# Patient Record
Sex: FEMALE | Race: WHITE | Employment: PART TIME | URBAN - METROPOLITAN AREA
[De-identification: names, ages, dates, MRNs, and addresses within clinical notes are randomized per-mention and may not be internally consistent; named-entity substitution may affect disease eponyms.]

---

## 2017-04-04 PROCEDURE — 88175 CYTOPATH C/V AUTO FLUID REDO: CPT | Performed by: OBSTETRICS & GYNECOLOGY

## 2017-04-04 PROCEDURE — 87624 HPV HI-RISK TYP POOLED RSLT: CPT | Performed by: OBSTETRICS & GYNECOLOGY

## 2017-04-11 ENCOUNTER — HOSPITAL ENCOUNTER (OUTPATIENT)
Dept: PHYSICAL THERAPY | Facility: HOSPITAL | Age: 37
Setting detail: THERAPIES SERIES
Discharge: HOME | End: 2017-04-11
Attending: PHYSICIAN ASSISTANT
Payer: COMMERCIAL

## 2017-04-11 DIAGNOSIS — M25.552 BILATERAL HIP PAIN: ICD-10-CM

## 2017-04-11 DIAGNOSIS — M54.50 ACUTE BILATERAL LOW BACK PAIN WITHOUT SCIATICA: ICD-10-CM

## 2017-04-11 DIAGNOSIS — M25.551 BILATERAL HIP PAIN: ICD-10-CM

## 2017-04-11 DIAGNOSIS — R20.2 LEFT LEG PARESTHESIAS: ICD-10-CM

## 2017-04-11 DIAGNOSIS — M76.31 ILIOTIBIAL BAND SYNDROME OF RIGHT SIDE: Primary | ICD-10-CM

## 2017-04-11 PROCEDURE — 97161 PT EVAL LOW COMPLEX 20 MIN: CPT

## 2017-04-11 PROCEDURE — 97110 THERAPEUTIC EXERCISES: CPT

## 2017-04-11 PROCEDURE — 97530 THERAPEUTIC ACTIVITIES: CPT

## 2017-04-11 NOTE — PROGRESS NOTES
SPINE EVALUATION:   Referring Physician: Dr. Salvador Wise  Diagnosis: KELLY hip pain, LBP, LLE paraesthesias     Date of Service: 4/11/2017     PATIENT SUMMARY   Silvano Seals is a 43year old female who presents to therapy today with complaints of R-reji about this, but is concerned that she won't be able to help him if he gets injured); sitting for several hours as on long drives and walking over a mile is also irritating.    Reena Hines describes prior level of function as with low level intermittent back pa Observation/Posture: Patient sits and stands at rest with rounded shoulders and forward head, flexible to VC. Rounded lumbar spine at rest.   Gait: Unremarkable.    Neuro Screen: NT this date, sensation intact per patient report, not experiencing paraesth test: min inc mm tension KELLY  Hip scour: negative KELLY  ROBERTO CARLOS: negative KELLY    Balance: NT this date, will assess in future as needed. Today’s Treatment and Response: Good overall response to treatment.  Patient appears motivated to improved and likely t participate in planning and for this course of care. Thank you for your referral. Please co-sign or sign and return this letter via fax as soon as possible to [phone].  If you have any questions, please contact me at Dept: [phone]    Sincerely,

## 2017-04-13 ENCOUNTER — HOSPITAL ENCOUNTER (OUTPATIENT)
Dept: PHYSICAL THERAPY | Facility: HOSPITAL | Age: 37
Setting detail: THERAPIES SERIES
Discharge: HOME | End: 2017-04-13
Attending: PHYSICIAN ASSISTANT
Payer: COMMERCIAL

## 2017-04-13 PROCEDURE — 97140 MANUAL THERAPY 1/> REGIONS: CPT

## 2017-04-13 PROCEDURE — 97110 THERAPEUTIC EXERCISES: CPT

## 2017-04-13 NOTE — PROGRESS NOTES
Dx: KELYL hip pain, LBP, LLE paraesthesias           Authorized # of Visits:  10        Next MD visit: none scheduled  Fall Risk: standard         Precautions: n/a             Subjective: Patient reports that she has been feeling improved the past few days s demonstrate ability to squat to floor to retrieve greater than 20# without pain to assist her son on the floor and lift groceries/laundry. 2. Patient will improve LE neural mobility by progressing appropriately to long sit slump tensioner.    3.  Patient w

## 2017-04-19 ENCOUNTER — HOSPITAL ENCOUNTER (OUTPATIENT)
Dept: PHYSICAL THERAPY | Facility: HOSPITAL | Age: 37
Setting detail: THERAPIES SERIES
Discharge: HOME | End: 2017-04-19
Attending: PHYSICIAN ASSISTANT
Payer: COMMERCIAL

## 2017-04-19 PROCEDURE — 97140 MANUAL THERAPY 1/> REGIONS: CPT

## 2017-04-19 PROCEDURE — 97110 THERAPEUTIC EXERCISES: CPT

## 2017-04-19 NOTE — PROGRESS NOTES
Dx: KELLY hip pain, LBP, LLE paraesthesias           Authorized # of Visits:  10        Next MD visit: none scheduled  Fall Risk: standard         Precautions: n/a             Subjective: Patient reports that she was feeling overall very good, but her allerg appropriately to long sit slump tensioner. - progress  3. Patient will improve lumbar extension AROM to 30 deg without pain. - progress   4.  Patient will demonstrate ability to jog on TM for 5 min without increase in pain to progress to return to independe

## 2017-04-21 ENCOUNTER — HOSPITAL ENCOUNTER (OUTPATIENT)
Dept: PHYSICAL THERAPY | Facility: HOSPITAL | Age: 37
Setting detail: THERAPIES SERIES
Discharge: HOME | End: 2017-04-21
Attending: PHYSICIAN ASSISTANT
Payer: COMMERCIAL

## 2017-04-21 PROCEDURE — 97110 THERAPEUTIC EXERCISES: CPT

## 2017-04-21 PROCEDURE — 97140 MANUAL THERAPY 1/> REGIONS: CPT

## 2017-04-21 NOTE — PROGRESS NOTES
Dx: KELLY hip pain, LBP, LLE paraesthesias           Authorized # of Visits:  10        Next MD visit: none scheduled  Fall Risk: standard         Precautions: n/a             Subjective: Patient reports that she is still being effected by her allergies, the Goals: (To be completed in 10 visits)  1. Patient will demonstrate ability to squat to floor to retrieve greater than 20# without pain to assist her son on the floor and lift groceries/laundry. - progress  2.  Patient will improve LE neural mobility b SB trunk rot GTB x 20 R/L       - GTB  - side step x 3 laps  - fwd/retro x 3 laps GTB  - side step with squat x 3 laps       - Bosu alt lunges x 12 R/L Bosu  - with 4.4# ball fwd reach x 10 R/L  - slow march x 20 R/L       Skilled Services: Progressed agapito

## 2017-04-26 ENCOUNTER — APPOINTMENT (OUTPATIENT)
Dept: PHYSICAL THERAPY | Facility: HOSPITAL | Age: 37
End: 2017-04-26
Attending: PHYSICIAN ASSISTANT
Payer: COMMERCIAL

## 2017-04-28 ENCOUNTER — HOSPITAL ENCOUNTER (OUTPATIENT)
Dept: PHYSICAL THERAPY | Facility: HOSPITAL | Age: 37
Setting detail: THERAPIES SERIES
Discharge: HOME | End: 2017-04-28
Attending: PHYSICIAN ASSISTANT
Payer: COMMERCIAL

## 2017-04-28 PROCEDURE — 97140 MANUAL THERAPY 1/> REGIONS: CPT

## 2017-04-28 PROCEDURE — 97110 THERAPEUTIC EXERCISES: CPT

## 2017-04-28 NOTE — PROGRESS NOTES
Dx: KELLY hip pain, LBP, LLE paraesthesias           Authorized # of Visits:  10        Next MD visit: none scheduled  Fall Risk: standard         Precautions: n/a             Subjective: Patient reports things have been a \"little off\" lately with sleeping without pain. - progress   4. Patient will demonstrate ability to jog on TM for 5 min without increase in pain to progress to return to independent gym program.    5.  Patient will be independent and compliant in HEP to maintain progress made in PT. - issue x 20 R/L Seated on SB trunk rot GTB x 20 R/L -      - GTB  - side step x 3 laps  - fwd/retro x 3 laps GTB  - side step with squat x 3 laps GTB  - side step with squat x 3 laps      - Bosu alt lunges x 12 R/L Bosu  - with 4.4# ball fwd reach x 10 R/L  - slo

## 2017-05-03 ENCOUNTER — HOSPITAL ENCOUNTER (OUTPATIENT)
Dept: PHYSICAL THERAPY | Facility: HOSPITAL | Age: 37
Setting detail: THERAPIES SERIES
Discharge: HOME | End: 2017-05-03
Attending: PHYSICIAN ASSISTANT
Payer: COMMERCIAL

## 2017-05-03 PROCEDURE — 97140 MANUAL THERAPY 1/> REGIONS: CPT

## 2017-05-03 PROCEDURE — 97110 THERAPEUTIC EXERCISES: CPT

## 2017-05-03 NOTE — PROGRESS NOTES
Dx: KELLY hip pain, LBP, LLE paraesthesias           Authorized # of Visits:  10        Next MD visit: none scheduled  Fall Risk: standard         Precautions: n/a             Subjective: Patient reports that she was really feeling pretty good for the last f neural mobility by progressing appropriately to long sit slump tensioner. - MET  3. Patient will improve lumbar extension AROM to 30 deg without pain. - progress, 26 deg   4.  Patient will demonstrate ability to jog on TM for 5 min without increase in pain without roller x 8 min - R hip STM x 3 min  - R hip HawkGrips x 5 min - R hip STM x 3 min  - SI METs     - SL N/E R x 2 min  - HS cont relax x 3 sets R/L - SL hip flexor glides x 15  - SL sara glide x 15 - SL hip flexor glides x 15  - SL sara glide x 15 -

## 2017-05-05 ENCOUNTER — HOSPITAL ENCOUNTER (OUTPATIENT)
Dept: PHYSICAL THERAPY | Facility: HOSPITAL | Age: 37
Setting detail: THERAPIES SERIES
Discharge: HOME | End: 2017-05-05
Attending: PHYSICIAN ASSISTANT
Payer: COMMERCIAL

## 2017-05-05 PROCEDURE — 97140 MANUAL THERAPY 1/> REGIONS: CPT

## 2017-05-05 PROCEDURE — 97110 THERAPEUTIC EXERCISES: CPT

## 2017-05-05 PROCEDURE — 97112 NEUROMUSCULAR REEDUCATION: CPT

## 2017-05-05 NOTE — PROGRESS NOTES
Dx: KELLY hip pain, LBP, LLE paraesthesias           Authorized # of Visits:  10        Next MD visit: none scheduled  Fall Risk: standard         Precautions: n/a             Subjective: Patient reports that while she is still very sore from her independent improve LE neural mobility by progressing appropriately to long sit slump tensioner. - MET  3. Patient will improve lumbar extension AROM to 30 deg without pain. - progress, 26 deg   4.  Patient will demonstrate ability to jog on TM for 5 min without increa DLS, 87# x 20  - SLS, 50# x 20 R/L Shuttle  - DLS, 19# x 20  - SLS, 50# x 20 R/L - -    - lumbar STM x 5 min  - lumbar central/uni mobs, gr III x 10 min - SL R hip STM with and without roller x 8 min - SL R hip STM with and without roller x 8 min - R hip S

## 2017-05-12 ENCOUNTER — HOSPITAL ENCOUNTER (OUTPATIENT)
Dept: PHYSICAL THERAPY | Facility: HOSPITAL | Age: 37
Setting detail: THERAPIES SERIES
Discharge: HOME | End: 2017-05-12
Attending: PHYSICIAN ASSISTANT
Payer: COMMERCIAL

## 2017-05-12 PROCEDURE — 97140 MANUAL THERAPY 1/> REGIONS: CPT

## 2017-05-12 PROCEDURE — 97110 THERAPEUTIC EXERCISES: CPT

## 2017-05-12 NOTE — PROGRESS NOTES
Dx: KELLY hip pain, LBP, LLE paraesthesias           Authorized # of Visits:  10        Next MD visit: none scheduled  Fall Risk: standard         Precautions: n/a             Subjective: Patient reports that her back and lateral thigh are feeling very good, ability to squat to floor to retrieve greater than 20# without pain to assist her son on the floor and lift groceries/laundry. - progress  2. Patient will improve LE neural mobility by progressing appropriately to long sit slump tensioner. - MET  3.  Kristen shirt at wall 2 x 10  - IT stretch 3 x 30s   - lumbar shift at wall x 10 R/L  - fwd flex foot up on step x 10 R/L - -       - fwd flex foot up on step x 10 R/L - lumbar shift at wall, 2 x 10 R/L  - fwd flex to floor on chair x 10 R/L x 10 - Prone hip ext w flexor involvement. Updated HEP as listed below. HEP: prone press ups, seated slump, standing lumbar shifts at wall to R/L, clams with RTB; 1/2 kneel hip flexor stretch with iso hold, IT stretch, prone alt hip ext with glute set.      Charges: Manual x 1,

## 2017-05-17 ENCOUNTER — HOSPITAL ENCOUNTER (OUTPATIENT)
Dept: PHYSICAL THERAPY | Facility: HOSPITAL | Age: 37
Setting detail: THERAPIES SERIES
Discharge: HOME | End: 2017-05-17
Attending: PHYSICIAN ASSISTANT
Payer: COMMERCIAL

## 2017-05-17 PROCEDURE — 97530 THERAPEUTIC ACTIVITIES: CPT

## 2017-05-17 PROCEDURE — 97140 MANUAL THERAPY 1/> REGIONS: CPT

## 2017-05-17 PROCEDURE — 97110 THERAPEUTIC EXERCISES: CPT

## 2017-05-17 NOTE — PROGRESS NOTES
Progress/Discharge Summary  Dx: KELLY hip pain, LBP, LLE paraesthesias           Authorized # of Visits:  10        Next MD visit: none scheduled  Fall Risk: standard         Precautions: n/a    Pt has attended 9, cancelled 0, and no shown 0 visits in Physi demonstrate ability to squat to floor to retrieve greater than 20# without pain to assist her son on the floor and lift groceries/laundry. - MET  2. Patient will improve LE neural mobility by progressing appropriately to long sit slump tensioner. - MET  3. Prone  - PPUs on hands x 10      - with light man ovpr x 10 - DKTC 5 x 30s  - seated flex/rot stretch 2 x 30s R/L - 1/2 kneel hip flexor stretch with iso hold x 10  - standing hip flexor stretch 3 x 30s - 1/2 kneel hip flexor stretch with iso hold x 10  - mobs/sacral rocking x 5 min - SL hip flexor stretch 3 x 30s  - lumbar PA mobs/sacral rocking x 5 min - lumbar, hip STM x 5 min  - sacral rocking/distract x 3 min   - Seated on SB trunk rot GTB x 20 R/L Seated on SB trunk rot GTB x 20 R/L - Seated on SB  -

## 2017-10-18 ENCOUNTER — APPOINTMENT (OUTPATIENT)
Dept: GENERAL RADIOLOGY | Age: 37
End: 2017-10-18
Attending: PHYSICIAN ASSISTANT
Payer: COMMERCIAL

## 2017-10-18 ENCOUNTER — HOSPITAL ENCOUNTER (EMERGENCY)
Age: 37
Discharge: HOME | End: 2017-10-18
Attending: EMERGENCY MEDICINE
Payer: COMMERCIAL

## 2017-10-18 VITALS
SYSTOLIC BLOOD PRESSURE: 134 MMHG | HEART RATE: 62 BPM | HEIGHT: 70 IN | RESPIRATION RATE: 16 BRPM | TEMPERATURE: 98 F | OXYGEN SATURATION: 100 % | BODY MASS INDEX: 28.35 KG/M2 | DIASTOLIC BLOOD PRESSURE: 73 MMHG | WEIGHT: 198 LBS

## 2017-10-18 DIAGNOSIS — S93.402A MODERATE LEFT ANKLE SPRAIN, INITIAL ENCOUNTER: Primary | ICD-10-CM

## 2017-10-18 PROCEDURE — 99284 EMERGENCY DEPT VISIT MOD MDM: CPT

## 2017-10-18 PROCEDURE — 73610 X-RAY EXAM OF ANKLE: CPT | Performed by: PHYSICIAN ASSISTANT

## 2017-10-18 PROCEDURE — 73590 X-RAY EXAM OF LOWER LEG: CPT | Performed by: PHYSICIAN ASSISTANT

## 2017-10-18 PROCEDURE — 73610 X-RAY EXAM OF ANKLE: CPT | Performed by: EMERGENCY MEDICINE

## 2017-10-18 RX ORDER — NAPROXEN 500 MG/1
500 TABLET ORAL 2 TIMES DAILY PRN
Qty: 30 TABLET | Refills: 0 | Status: SHIPPED | OUTPATIENT
Start: 2017-10-18 | End: 2017-10-25

## 2017-10-18 NOTE — ED PROVIDER NOTES
Patient Seen in: [facility] In [place]    History   Patient presents with:  Lower Extremity Injury (musculoskeletal)    Stated Complaint: LEFT ANKLE INJURY ON TREADMILL NO LOC    HPI    Abigail Henry is a 26-year-old female who comes in to [facility] elements reviewed from today and agreed except as otherwise stated in HPI.     Physical Exam   ED Triage Vitals [10/18/17 1418]  BP: 134/73  Pulse: 62  Resp: 16  Temp: 97.8 °F (36.6 °C)  Temp src: Temporal  SpO2: 100 %  O2 Device: None (Room air)    Cu PROCEDURE:  XR ANKLE (MIN 3 VIEWS), LEFT (CPT=73610)  TECHNIQUE:  Three views were obtained. COMPARISON:  None.   INDICATIONS:  LEFT ANKLE INJURY ON TREADMILL NO LOC  PATIENT STATED HISTORY: (As transcribed by Technologist)  Patient fell on a treadmill at Clinical Impression:  Moderate left ankle sprain, initial encounter  (primary encounter diagnosis)    Disposition:  Discharge    Follow-up:  Tayla Atkinson, [address] [facility]/ [address]          [place]

## 2017-10-18 NOTE — ED INITIAL ASSESSMENT (HPI)
STATES JUST PRIOR TO ARRIVAL SHE STEPPED OFF TREADMILL AND ROLLED ANKLE, STRUCK CHIN, NO LOC SWELLING TO LEFT LATERAL ANKLE NOTED PULSES PALPATED ICE PACK APPLIED

## 2017-10-18 NOTE — ED PROVIDER NOTES
I reviewed that chart and discussed the case with the physician assistant. I have examined the patient and noted ankle sprain and mild shin abrasion. Patient be discharged home with supportive care.     I agree with the physician assistant assessment and

## 2018-09-02 ENCOUNTER — HOSPITAL ENCOUNTER (EMERGENCY)
Age: 38
Discharge: HOME | End: 2018-09-02
Attending: EMERGENCY MEDICINE
Payer: COMMERCIAL

## 2018-09-02 VITALS
TEMPERATURE: 98 F | OXYGEN SATURATION: 99 % | SYSTOLIC BLOOD PRESSURE: 142 MMHG | BODY MASS INDEX: 27.92 KG/M2 | WEIGHT: 195 LBS | HEART RATE: 63 BPM | RESPIRATION RATE: 20 BRPM | DIASTOLIC BLOOD PRESSURE: 93 MMHG | HEIGHT: 70 IN

## 2018-09-02 DIAGNOSIS — T63.444A BEE STING, UNDETERMINED INTENT, INITIAL ENCOUNTER: Primary | ICD-10-CM

## 2018-09-02 PROCEDURE — 99283 EMERGENCY DEPT VISIT LOW MDM: CPT

## 2018-09-02 RX ORDER — PREDNISONE 20 MG/1
60 TABLET ORAL ONCE
Status: COMPLETED | OUTPATIENT
Start: 2018-09-02 | End: 2018-09-02

## 2018-09-02 RX ORDER — METHYLPREDNISOLONE 4 MG/1
TABLET ORAL
Qty: 1 PACKAGE | Refills: 0 | Status: SHIPPED | OUTPATIENT
Start: 2018-09-02 | End: 2018-09-07

## 2018-09-03 NOTE — ED INITIAL ASSESSMENT (HPI)
Pt to ed for eval of worsening redness after being bit by bee on yesterday today area increasing in size area red warm

## 2018-09-03 NOTE — ED PROVIDER NOTES
Patient Seen in: [facility] In [place]    History   Patient presents with:  Bite Sting,Insect (integumentary)    Stated Complaint: bee sting    HPI    Patient was stung by B 24 hours ago. This is at the right thigh.   She seen by ophelia Neck: Normal range of motion. Neck supple. Cardiovascular: Normal rate and intact distal pulses. Pulmonary/Chest: Effort normal. No respiratory distress. Abdominal: Soft. She exhibits no distension. There is no tenderness.    Musculoskeletal: [place]

## 2018-09-29 PROBLEM — J30.0 VMR (VASOMOTOR RHINITIS): Status: ACTIVE | Noted: 2018-09-29

## 2019-02-12 PROCEDURE — 36415 COLL VENOUS BLD VENIPUNCTURE: CPT | Performed by: PHYSICIAN ASSISTANT

## 2019-02-12 PROCEDURE — 86480 TB TEST CELL IMMUN MEASURE: CPT | Performed by: PHYSICIAN ASSISTANT

## 2019-06-28 PROBLEM — S99.912D INJURY OF LEFT ANKLE, SUBSEQUENT ENCOUNTER: Status: ACTIVE | Noted: 2019-06-28

## 2021-07-01 ENCOUNTER — IMAGING SERVICES (OUTPATIENT)
Dept: OTHER | Age: 41
End: 2021-07-01
Attending: PODIATRIST

## 2021-08-27 PROBLEM — S99.912D INJURY OF LEFT ANKLE, SUBSEQUENT ENCOUNTER: Status: RESOLVED | Noted: 2019-06-28 | Resolved: 2021-08-27

## 2021-08-27 PROBLEM — E66.3 OVERWEIGHT WITH BODY MASS INDEX (BMI) 25.0-29.9: Status: ACTIVE | Noted: 2021-08-27

## 2021-10-05 ENCOUNTER — OFFICE VISIT (OUTPATIENT)
Dept: SPORTS MEDICINE | Age: 41
End: 2021-10-05

## 2021-10-05 VITALS
DIASTOLIC BLOOD PRESSURE: 78 MMHG | HEIGHT: 69 IN | WEIGHT: 200 LBS | RESPIRATION RATE: 20 BRPM | BODY MASS INDEX: 29.62 KG/M2 | SYSTOLIC BLOOD PRESSURE: 134 MMHG

## 2021-10-05 DIAGNOSIS — M25.472 EFFUSION OF LEFT ANKLE: ICD-10-CM

## 2021-10-05 DIAGNOSIS — M21.6X2 HINDFOOT PRONATION OF BOTH FEET: ICD-10-CM

## 2021-10-05 DIAGNOSIS — M21.6X1 HINDFOOT PRONATION OF BOTH FEET: ICD-10-CM

## 2021-10-05 DIAGNOSIS — M67.00 ACQUIRED SHORT ACHILLES TENDON, UNSPECIFIED LATERALITY: ICD-10-CM

## 2021-10-05 DIAGNOSIS — M21.41 PES PLANUS OF BOTH FEET: ICD-10-CM

## 2021-10-05 DIAGNOSIS — M67.979 TIBIALIS POSTERIOR TENDINOPATHY: ICD-10-CM

## 2021-10-05 DIAGNOSIS — M21.42 PES PLANUS OF BOTH FEET: ICD-10-CM

## 2021-10-05 DIAGNOSIS — M92.60 HAGLUND'S DEFORMITY: Primary | ICD-10-CM

## 2021-10-05 PROCEDURE — 99204 OFFICE O/P NEW MOD 45 MIN: CPT | Performed by: PEDIATRICS

## 2021-10-05 RX ORDER — CLINDAMYCIN PHOSPHATE 10 UG/ML
LOTION TOPICAL
COMMUNITY
Start: 2021-06-17

## 2021-10-05 RX ORDER — ERGOCALCIFEROL 1.25 MG/1
50000 CAPSULE ORAL
COMMUNITY

## 2021-10-05 RX ORDER — FLUTICASONE PROPIONATE 50 MCG
2 SPRAY, SUSPENSION (ML) NASAL
COMMUNITY
Start: 2021-05-17

## 2021-10-05 RX ORDER — ALBUTEROL SULFATE 90 UG/1
AEROSOL, METERED RESPIRATORY (INHALATION)
COMMUNITY
Start: 2021-05-17

## 2021-10-05 RX ORDER — PREDNISONE 10 MG/1
TABLET ORAL
COMMUNITY
Start: 2021-08-05

## 2021-10-05 RX ORDER — EPINEPHRINE 0.15 MG/.3ML
0.15 INJECTION INTRAMUSCULAR
COMMUNITY

## 2021-10-05 RX ORDER — BETAMETHASONE DIPROPIONATE 0.5 MG/G
CREAM TOPICAL
COMMUNITY
Start: 2021-09-14

## 2021-10-05 RX ORDER — LEVETIRACETAM 1000 MG/1
TABLET ORAL 2 TIMES DAILY
COMMUNITY

## 2021-10-06 ENCOUNTER — TELEPHONE (OUTPATIENT)
Dept: SPORTS MEDICINE | Age: 41
End: 2021-10-06

## 2021-10-12 ENCOUNTER — EXTERNAL RECORD (OUTPATIENT)
Dept: HEALTH INFORMATION MANAGEMENT | Facility: OTHER | Age: 41
End: 2021-10-12

## 2021-10-13 ENCOUNTER — TELEPHONE (OUTPATIENT)
Dept: SPORTS MEDICINE | Age: 41
End: 2021-10-13

## 2021-11-17 ENCOUNTER — EXTERNAL RECORD (OUTPATIENT)
Dept: HEALTH INFORMATION MANAGEMENT | Facility: OTHER | Age: 41
End: 2021-11-17

## 2021-12-14 ENCOUNTER — APPOINTMENT (OUTPATIENT)
Dept: SPORTS MEDICINE | Age: 41
End: 2021-12-14

## 2021-12-15 ENCOUNTER — EXTERNAL RECORD (OUTPATIENT)
Dept: HEALTH INFORMATION MANAGEMENT | Facility: OTHER | Age: 41
End: 2021-12-15

## 2021-12-20 ENCOUNTER — OFFICE VISIT (OUTPATIENT)
Dept: SPORTS MEDICINE | Age: 41
End: 2021-12-20

## 2021-12-20 VITALS
HEIGHT: 69 IN | WEIGHT: 200 LBS | SYSTOLIC BLOOD PRESSURE: 120 MMHG | BODY MASS INDEX: 29.62 KG/M2 | DIASTOLIC BLOOD PRESSURE: 78 MMHG | HEART RATE: 76 BPM

## 2021-12-20 DIAGNOSIS — M21.42 PES PLANUS OF BOTH FEET: ICD-10-CM

## 2021-12-20 DIAGNOSIS — M21.6X1 HINDFOOT PRONATION OF BOTH FEET: ICD-10-CM

## 2021-12-20 DIAGNOSIS — M25.472 EFFUSION OF LEFT ANKLE: ICD-10-CM

## 2021-12-20 DIAGNOSIS — M21.6X2 HINDFOOT PRONATION OF BOTH FEET: ICD-10-CM

## 2021-12-20 DIAGNOSIS — M67.00 ACQUIRED SHORT ACHILLES TENDON, UNSPECIFIED LATERALITY: ICD-10-CM

## 2021-12-20 DIAGNOSIS — M21.41 PES PLANUS OF BOTH FEET: ICD-10-CM

## 2021-12-20 DIAGNOSIS — M67.979 TIBIALIS POSTERIOR TENDINOPATHY: Primary | ICD-10-CM

## 2021-12-20 PROCEDURE — 99213 OFFICE O/P EST LOW 20 MIN: CPT | Performed by: PEDIATRICS

## 2022-01-26 ENCOUNTER — EXTERNAL RECORD (OUTPATIENT)
Dept: HEALTH INFORMATION MANAGEMENT | Facility: OTHER | Age: 42
End: 2022-01-26

## 2022-02-02 ENCOUNTER — OFFICE VISIT (OUTPATIENT)
Dept: RHEUMATOLOGY | Facility: CLINIC | Age: 42
End: 2022-02-02
Payer: COMMERCIAL

## 2022-02-02 VITALS
SYSTOLIC BLOOD PRESSURE: 142 MMHG | BODY MASS INDEX: 30.51 KG/M2 | RESPIRATION RATE: 16 BRPM | WEIGHT: 206 LBS | HEART RATE: 72 BPM | TEMPERATURE: 100 F | HEIGHT: 69 IN | DIASTOLIC BLOOD PRESSURE: 98 MMHG

## 2022-02-02 DIAGNOSIS — R53.82 CHRONIC FATIGUE: ICD-10-CM

## 2022-02-02 DIAGNOSIS — M65.9 TENOSYNOVITIS OF LEFT ANKLE: ICD-10-CM

## 2022-02-02 DIAGNOSIS — R79.82 ELEVATED C-REACTIVE PROTEIN (CRP): ICD-10-CM

## 2022-02-02 DIAGNOSIS — M19.90 INFLAMMATORY ARTHRITIS: Primary | ICD-10-CM

## 2022-02-02 DIAGNOSIS — M62.89 MUSCLE TIGHTNESS: ICD-10-CM

## 2022-02-02 DIAGNOSIS — J32.9 CHRONIC SINUSITIS, UNSPECIFIED LOCATION: ICD-10-CM

## 2022-02-02 PROCEDURE — 3077F SYST BP >= 140 MM HG: CPT | Performed by: INTERNAL MEDICINE

## 2022-02-02 PROCEDURE — 99205 OFFICE O/P NEW HI 60 MIN: CPT | Performed by: INTERNAL MEDICINE

## 2022-02-02 PROCEDURE — 3080F DIAST BP >= 90 MM HG: CPT | Performed by: INTERNAL MEDICINE

## 2022-02-02 PROCEDURE — 3008F BODY MASS INDEX DOCD: CPT | Performed by: INTERNAL MEDICINE

## 2022-02-11 LAB
14.3.3 ETA PROTEIN: <0.2 NG/ML
ANA SCREEN, IFA: POSITIVE
B2 GLYCOPROTEIN I (IGA)AB: <2 U/ML
B2 GLYCOPROTEIN I (IGG)AB: <2 U/ML
B2 GLYCOPROTEIN I(IGM)AB: <2 U/ML
C-REACTIVE PROTEIN: 2 MG/L
CARDIOLIPIN AB (IGA): <2 APL-U/ML
CARDIOLIPIN AB (IGG): <2 GPL-U/ML
CARDIOLIPIN AB (IGM): <2 MPL-U/ML
COMPLEMENT COMPONENT C3C: 131 MG/DL (ref 83–193)
COMPLEMENT COMPONENT C4C: 21 MG/DL (ref 15–57)
CREATINE KINASE, TOTAL: 81 U/L (ref 29–143)
CYCLIC CITRULLINATED$PEPTIDE (CCP) AB (IGG): <16 UNITS
DNA AB (DS) CRITHIDIA,IFA: NEGATIVE
HLA-B27 ANTIGEN: NEGATIVE
IMMUNOGLOBULIN A: 110 MG/DL (ref 47–310)
IMMUNOGLOBULIN G: 844 MG/DL (ref 600–1640)
IMMUNOGLOBULIN M: 56 MG/DL (ref 50–300)
MYELOPEROXIDASE ANTIBODY: <1 AI
PROTEINASE-3 ANTIBODY: <1 AI
RHEUMATOID FACTOR (IGA): <5 U
RHEUMATOID FACTOR (IGG): <5 U
RHEUMATOID FACTOR (IGM): <5 U
SED RATE BY MODIFIED$WESTERGREN: 6 MM/H
THYROID PEROXIDASE$ANTIBODIES: <1 IU/ML
VITAMIN B12: 424 PG/ML (ref 200–1100)

## 2022-02-16 ENCOUNTER — OFFICE VISIT (OUTPATIENT)
Dept: RHEUMATOLOGY | Facility: CLINIC | Age: 42
End: 2022-02-16
Payer: COMMERCIAL

## 2022-02-16 VITALS
DIASTOLIC BLOOD PRESSURE: 88 MMHG | HEIGHT: 69 IN | TEMPERATURE: 98 F | WEIGHT: 209 LBS | BODY MASS INDEX: 30.96 KG/M2 | HEART RATE: 64 BPM | RESPIRATION RATE: 16 BRPM | SYSTOLIC BLOOD PRESSURE: 118 MMHG

## 2022-02-16 DIAGNOSIS — G89.29 CHRONIC MYOFASCIAL PAIN: Primary | ICD-10-CM

## 2022-02-16 DIAGNOSIS — R53.82 CHRONIC FATIGUE: ICD-10-CM

## 2022-02-16 DIAGNOSIS — M79.18 CHRONIC MYOFASCIAL PAIN: Primary | ICD-10-CM

## 2022-02-16 DIAGNOSIS — M65.9 TENOSYNOVITIS OF LEFT ANKLE: ICD-10-CM

## 2022-02-16 PROCEDURE — 3008F BODY MASS INDEX DOCD: CPT | Performed by: INTERNAL MEDICINE

## 2022-02-16 PROCEDURE — 99215 OFFICE O/P EST HI 40 MIN: CPT | Performed by: INTERNAL MEDICINE

## 2022-02-16 PROCEDURE — 3074F SYST BP LT 130 MM HG: CPT | Performed by: INTERNAL MEDICINE

## 2022-02-16 PROCEDURE — 3079F DIAST BP 80-89 MM HG: CPT | Performed by: INTERNAL MEDICINE

## 2022-03-15 ENCOUNTER — TELEPHONE (OUTPATIENT)
Dept: PHYSICAL THERAPY | Facility: HOSPITAL | Age: 42
End: 2022-03-15

## 2022-03-17 ENCOUNTER — OFFICE VISIT (OUTPATIENT)
Dept: PHYSICAL THERAPY | Facility: HOSPITAL | Age: 42
End: 2022-03-17
Attending: INTERNAL MEDICINE
Payer: COMMERCIAL

## 2022-03-17 DIAGNOSIS — M79.18 CHRONIC MYOFASCIAL PAIN: ICD-10-CM

## 2022-03-17 DIAGNOSIS — G89.29 CHRONIC MYOFASCIAL PAIN: ICD-10-CM

## 2022-03-17 PROCEDURE — 97162 PT EVAL MOD COMPLEX 30 MIN: CPT

## 2022-03-17 PROCEDURE — 97112 NEUROMUSCULAR REEDUCATION: CPT

## 2022-03-21 ENCOUNTER — OFFICE VISIT (OUTPATIENT)
Dept: PHYSICAL THERAPY | Facility: HOSPITAL | Age: 42
End: 2022-03-21
Attending: INTERNAL MEDICINE
Payer: COMMERCIAL

## 2022-03-21 PROCEDURE — 97140 MANUAL THERAPY 1/> REGIONS: CPT

## 2022-03-21 PROCEDURE — 97110 THERAPEUTIC EXERCISES: CPT

## 2022-03-24 ENCOUNTER — OFFICE VISIT (OUTPATIENT)
Dept: PHYSICAL THERAPY | Facility: HOSPITAL | Age: 42
End: 2022-03-24
Attending: INTERNAL MEDICINE
Payer: COMMERCIAL

## 2022-03-24 PROCEDURE — 97110 THERAPEUTIC EXERCISES: CPT

## 2022-04-04 ENCOUNTER — APPOINTMENT (OUTPATIENT)
Dept: PHYSICAL THERAPY | Facility: HOSPITAL | Age: 42
End: 2022-04-04
Attending: INTERNAL MEDICINE
Payer: COMMERCIAL

## 2022-04-07 ENCOUNTER — APPOINTMENT (OUTPATIENT)
Dept: PHYSICAL THERAPY | Facility: HOSPITAL | Age: 42
End: 2022-04-07
Attending: INTERNAL MEDICINE
Payer: COMMERCIAL

## 2022-04-11 ENCOUNTER — APPOINTMENT (OUTPATIENT)
Dept: PHYSICAL THERAPY | Facility: HOSPITAL | Age: 42
End: 2022-04-11
Attending: INTERNAL MEDICINE
Payer: COMMERCIAL

## 2022-04-14 ENCOUNTER — OFFICE VISIT (OUTPATIENT)
Dept: PHYSICAL THERAPY | Facility: HOSPITAL | Age: 42
End: 2022-04-14
Attending: INTERNAL MEDICINE
Payer: COMMERCIAL

## 2022-04-14 PROCEDURE — 97110 THERAPEUTIC EXERCISES: CPT

## 2022-04-14 PROCEDURE — 97140 MANUAL THERAPY 1/> REGIONS: CPT

## 2022-04-18 ENCOUNTER — APPOINTMENT (OUTPATIENT)
Dept: PHYSICAL THERAPY | Facility: HOSPITAL | Age: 42
End: 2022-04-18
Attending: INTERNAL MEDICINE
Payer: COMMERCIAL

## 2022-04-18 PROCEDURE — 97110 THERAPEUTIC EXERCISES: CPT

## 2022-04-18 PROCEDURE — 97140 MANUAL THERAPY 1/> REGIONS: CPT

## 2022-04-26 ENCOUNTER — OFFICE VISIT (OUTPATIENT)
Dept: PHYSICAL THERAPY | Facility: HOSPITAL | Age: 42
End: 2022-04-26
Attending: INTERNAL MEDICINE
Payer: COMMERCIAL

## 2022-04-26 PROCEDURE — 97140 MANUAL THERAPY 1/> REGIONS: CPT

## 2022-04-26 PROCEDURE — 97110 THERAPEUTIC EXERCISES: CPT

## 2022-05-05 ENCOUNTER — APPOINTMENT (OUTPATIENT)
Dept: PHYSICAL THERAPY | Facility: HOSPITAL | Age: 42
End: 2022-05-05
Attending: INTERNAL MEDICINE
Payer: COMMERCIAL

## 2022-05-12 ENCOUNTER — OFFICE VISIT (OUTPATIENT)
Dept: PHYSICAL THERAPY | Facility: HOSPITAL | Age: 42
End: 2022-05-12
Attending: INTERNAL MEDICINE
Payer: COMMERCIAL

## 2022-05-12 PROCEDURE — 97140 MANUAL THERAPY 1/> REGIONS: CPT

## 2022-05-12 PROCEDURE — 97110 THERAPEUTIC EXERCISES: CPT

## 2022-06-01 ENCOUNTER — OFFICE VISIT (OUTPATIENT)
Dept: RHEUMATOLOGY | Facility: CLINIC | Age: 42
End: 2022-06-01
Payer: COMMERCIAL

## 2022-06-01 VITALS
SYSTOLIC BLOOD PRESSURE: 114 MMHG | TEMPERATURE: 99 F | HEART RATE: 78 BPM | DIASTOLIC BLOOD PRESSURE: 62 MMHG | BODY MASS INDEX: 30.66 KG/M2 | HEIGHT: 69 IN | OXYGEN SATURATION: 99 % | WEIGHT: 207 LBS | RESPIRATION RATE: 16 BRPM

## 2022-06-01 DIAGNOSIS — R53.82 CHRONIC FATIGUE: ICD-10-CM

## 2022-06-01 DIAGNOSIS — M65.9 TENOSYNOVITIS OF LEFT ANKLE: ICD-10-CM

## 2022-06-01 DIAGNOSIS — M79.18 CHRONIC MYOFASCIAL PAIN: Primary | ICD-10-CM

## 2022-06-01 DIAGNOSIS — G89.29 CHRONIC MYOFASCIAL PAIN: Primary | ICD-10-CM

## 2022-06-01 PROCEDURE — 3008F BODY MASS INDEX DOCD: CPT | Performed by: INTERNAL MEDICINE

## 2022-06-01 PROCEDURE — 3078F DIAST BP <80 MM HG: CPT | Performed by: INTERNAL MEDICINE

## 2022-06-01 PROCEDURE — 99214 OFFICE O/P EST MOD 30 MIN: CPT | Performed by: INTERNAL MEDICINE

## 2022-06-01 PROCEDURE — 3074F SYST BP LT 130 MM HG: CPT | Performed by: INTERNAL MEDICINE

## 2022-09-28 ENCOUNTER — OFFICE VISIT (OUTPATIENT)
Dept: RHEUMATOLOGY | Facility: CLINIC | Age: 42
End: 2022-09-28

## 2022-09-28 VITALS
DIASTOLIC BLOOD PRESSURE: 80 MMHG | RESPIRATION RATE: 16 BRPM | BODY MASS INDEX: 29.62 KG/M2 | SYSTOLIC BLOOD PRESSURE: 116 MMHG | HEART RATE: 66 BPM | WEIGHT: 200 LBS | HEIGHT: 69 IN | TEMPERATURE: 98 F | OXYGEN SATURATION: 99 %

## 2022-09-28 DIAGNOSIS — R53.82 CHRONIC FATIGUE: ICD-10-CM

## 2022-09-28 DIAGNOSIS — G89.29 CHRONIC MYOFASCIAL PAIN: Primary | ICD-10-CM

## 2022-09-28 DIAGNOSIS — M79.18 CHRONIC MYOFASCIAL PAIN: Primary | ICD-10-CM

## 2022-09-28 DIAGNOSIS — M65.9 TENOSYNOVITIS OF LEFT ANKLE: ICD-10-CM

## 2022-09-28 PROCEDURE — 3008F BODY MASS INDEX DOCD: CPT | Performed by: INTERNAL MEDICINE

## 2022-09-28 PROCEDURE — 3074F SYST BP LT 130 MM HG: CPT | Performed by: INTERNAL MEDICINE

## 2022-09-28 PROCEDURE — 99214 OFFICE O/P EST MOD 30 MIN: CPT | Performed by: INTERNAL MEDICINE

## 2022-09-28 PROCEDURE — 3079F DIAST BP 80-89 MM HG: CPT | Performed by: INTERNAL MEDICINE

## 2022-09-28 RX ORDER — PREDNISONE 20 MG/1
TABLET ORAL
COMMUNITY
Start: 2022-09-01

## 2022-09-28 RX ORDER — MULTIVITAMIN
TABLET ORAL
COMMUNITY

## 2022-09-28 RX ORDER — IPRATROPIUM BROMIDE 42 UG/1
SPRAY, METERED NASAL
COMMUNITY
Start: 2022-09-01

## 2023-01-30 ENCOUNTER — OFFICE VISIT (OUTPATIENT)
Dept: INTERNAL MEDICINE CLINIC | Facility: CLINIC | Age: 43
End: 2023-01-30
Payer: COMMERCIAL

## 2023-01-30 ENCOUNTER — PATIENT MESSAGE (OUTPATIENT)
Dept: RHEUMATOLOGY | Facility: CLINIC | Age: 43
End: 2023-01-30

## 2023-01-30 VITALS
DIASTOLIC BLOOD PRESSURE: 90 MMHG | RESPIRATION RATE: 18 BRPM | WEIGHT: 203.81 LBS | OXYGEN SATURATION: 98 % | HEART RATE: 76 BPM | BODY MASS INDEX: 30.19 KG/M2 | HEIGHT: 69 IN | SYSTOLIC BLOOD PRESSURE: 128 MMHG

## 2023-01-30 DIAGNOSIS — Z13.0 SCREENING, ANEMIA, DEFICIENCY, IRON: ICD-10-CM

## 2023-01-30 DIAGNOSIS — M25.50 POLYARTHRALGIA: ICD-10-CM

## 2023-01-30 DIAGNOSIS — M79.18 CHRONIC MYOFASCIAL PAIN: Primary | ICD-10-CM

## 2023-01-30 DIAGNOSIS — J32.9 FREQUENT SINUS INFECTIONS: ICD-10-CM

## 2023-01-30 DIAGNOSIS — G89.29 CHRONIC MYOFASCIAL PAIN: Primary | ICD-10-CM

## 2023-01-30 DIAGNOSIS — Z13.1 SCREENING FOR DIABETES MELLITUS (DM): ICD-10-CM

## 2023-01-30 DIAGNOSIS — Z13.21 ENCOUNTER FOR VITAMIN DEFICIENCY SCREENING: ICD-10-CM

## 2023-01-30 DIAGNOSIS — G40.209 PARTIAL EPILEPSY WITH IMPAIRMENT OF CONSCIOUSNESS (HCC): ICD-10-CM

## 2023-01-30 DIAGNOSIS — Z13.29 SCREENING FOR THYROID DISORDER: ICD-10-CM

## 2023-01-30 DIAGNOSIS — G43.809 OTHER MIGRAINE WITHOUT STATUS MIGRAINOSUS, NOT INTRACTABLE: Primary | ICD-10-CM

## 2023-01-30 DIAGNOSIS — F41.9 ANXIETY: ICD-10-CM

## 2023-01-30 DIAGNOSIS — Z13.220 SCREENING, LIPID: ICD-10-CM

## 2023-01-30 PROCEDURE — 3008F BODY MASS INDEX DOCD: CPT | Performed by: PHYSICIAN ASSISTANT

## 2023-01-30 PROCEDURE — 3074F SYST BP LT 130 MM HG: CPT | Performed by: PHYSICIAN ASSISTANT

## 2023-01-30 PROCEDURE — 99203 OFFICE O/P NEW LOW 30 MIN: CPT | Performed by: PHYSICIAN ASSISTANT

## 2023-01-30 PROCEDURE — 3080F DIAST BP >= 90 MM HG: CPT | Performed by: PHYSICIAN ASSISTANT

## 2023-02-07 DIAGNOSIS — E61.1 IRON DEFICIENCY: Primary | ICD-10-CM

## 2023-02-07 LAB
% SATURATION: 16 % (CALC) (ref 16–45)
ABSOLUTE BASOPHILS: 40 CELLS/UL (ref 0–200)
ABSOLUTE EOSINOPHILS: 128 CELLS/UL (ref 15–500)
ABSOLUTE LYMPHOCYTES: 1342 CELLS/UL (ref 850–3900)
ABSOLUTE MONOCYTES: 339 CELLS/UL (ref 200–950)
ABSOLUTE NEUTROPHILS: 2552 CELLS/UL (ref 1500–7800)
ALBUMIN/GLOBULIN RATIO: 1.7 (CALC) (ref 1–2.5)
ALBUMIN: 3.9 G/DL (ref 3.6–5.1)
ALKALINE PHOSPHATASE: 69 U/L (ref 31–125)
ALT: 13 U/L (ref 6–29)
AST: 17 U/L (ref 10–30)
BASOPHILS: 0.9 %
BILIRUBIN, TOTAL: 0.3 MG/DL (ref 0.2–1.2)
BUN: 16 MG/DL (ref 7–25)
CALCIUM: 9.3 MG/DL (ref 8.6–10.2)
CARBON DIOXIDE: 30 MMOL/L (ref 20–32)
CHLORIDE: 106 MMOL/L (ref 98–110)
CHOL/HDLC RATIO: 2.8 (CALC)
CHOLESTEROL, TOTAL: 171 MG/DL
CREATININE: 0.77 MG/DL (ref 0.5–0.99)
EGFR: 97 ML/MIN/1.73M2
EOSINOPHILS: 2.9 %
FERRITIN: 5 NG/ML (ref 16–232)
GLOBULIN: 2.3 G/DL (CALC) (ref 1.9–3.7)
GLUCOSE: 74 MG/DL (ref 65–99)
HDL CHOLESTEROL: 61 MG/DL
HEMATOCRIT: 41.5 % (ref 35–45)
HEMOGLOBIN: 13.4 G/DL (ref 11.7–15.5)
IRON BINDING CAPACITY: 367 MCG/DL (CALC) (ref 250–450)
IRON, TOTAL: 57 MCG/DL (ref 40–190)
LDL-CHOLESTEROL: 95 MG/DL (CALC)
LYMPHOCYTES: 30.5 %
MCH: 29.5 PG (ref 27–33)
MCHC: 32.3 G/DL (ref 32–36)
MCV: 91.4 FL (ref 80–100)
MONOCYTES: 7.7 %
MPV: 12.1 FL (ref 7.5–12.5)
NEUTROPHILS: 58 %
NON-HDL CHOLESTEROL: 110 MG/DL (CALC)
PLATELET COUNT: 221 THOUSAND/UL (ref 140–400)
POTASSIUM: 4.9 MMOL/L (ref 3.5–5.3)
PROTEIN, TOTAL: 6.2 G/DL (ref 6.1–8.1)
RDW: 12.2 % (ref 11–15)
RED BLOOD CELL COUNT: 4.54 MILLION/UL (ref 3.8–5.1)
SODIUM: 143 MMOL/L (ref 135–146)
TRIGLYCERIDES: 67 MG/DL
TSH W/REFLEX TO FT4: 3.95 MIU/L
VITAMIN B12: 503 PG/ML (ref 200–1100)
WHITE BLOOD CELL COUNT: 4.4 THOUSAND/UL (ref 3.8–10.8)

## 2023-02-16 ENCOUNTER — TELEPHONE (OUTPATIENT)
Dept: PHYSICAL THERAPY | Facility: HOSPITAL | Age: 43
End: 2023-02-16

## 2023-02-17 ENCOUNTER — OFFICE VISIT (OUTPATIENT)
Dept: PHYSICAL THERAPY | Facility: HOSPITAL | Age: 43
End: 2023-02-17
Attending: INTERNAL MEDICINE
Payer: COMMERCIAL

## 2023-02-17 DIAGNOSIS — M25.50 POLYARTHRALGIA: ICD-10-CM

## 2023-02-17 DIAGNOSIS — G89.29 CHRONIC MYOFASCIAL PAIN: ICD-10-CM

## 2023-02-17 DIAGNOSIS — M79.18 CHRONIC MYOFASCIAL PAIN: ICD-10-CM

## 2023-02-17 PROCEDURE — 97110 THERAPEUTIC EXERCISES: CPT

## 2023-02-17 PROCEDURE — 97161 PT EVAL LOW COMPLEX 20 MIN: CPT

## 2023-02-21 ENCOUNTER — OFFICE VISIT (OUTPATIENT)
Dept: PHYSICAL THERAPY | Facility: HOSPITAL | Age: 43
End: 2023-02-21
Attending: INTERNAL MEDICINE
Payer: COMMERCIAL

## 2023-02-21 PROCEDURE — 97110 THERAPEUTIC EXERCISES: CPT

## 2023-02-24 ENCOUNTER — OFFICE VISIT (OUTPATIENT)
Dept: PHYSICAL THERAPY | Facility: HOSPITAL | Age: 43
End: 2023-02-24
Attending: INTERNAL MEDICINE
Payer: COMMERCIAL

## 2023-02-24 PROCEDURE — 97110 THERAPEUTIC EXERCISES: CPT

## 2023-02-28 ENCOUNTER — OFFICE VISIT (OUTPATIENT)
Dept: PHYSICAL THERAPY | Facility: HOSPITAL | Age: 43
End: 2023-02-28
Attending: INTERNAL MEDICINE
Payer: COMMERCIAL

## 2023-02-28 ENCOUNTER — OFFICE VISIT (OUTPATIENT)
Dept: RHEUMATOLOGY | Facility: CLINIC | Age: 43
End: 2023-02-28
Payer: COMMERCIAL

## 2023-02-28 VITALS
RESPIRATION RATE: 16 BRPM | TEMPERATURE: 98 F | BODY MASS INDEX: 29.94 KG/M2 | HEIGHT: 69 IN | DIASTOLIC BLOOD PRESSURE: 82 MMHG | HEART RATE: 69 BPM | WEIGHT: 202.13 LBS | OXYGEN SATURATION: 95 % | SYSTOLIC BLOOD PRESSURE: 118 MMHG

## 2023-02-28 DIAGNOSIS — M65.9 TENOSYNOVITIS OF LEFT ANKLE: ICD-10-CM

## 2023-02-28 DIAGNOSIS — M25.50 POLYARTHRALGIA: ICD-10-CM

## 2023-02-28 DIAGNOSIS — R53.82 CHRONIC FATIGUE: ICD-10-CM

## 2023-02-28 DIAGNOSIS — G89.29 CHRONIC MYOFASCIAL PAIN: Primary | ICD-10-CM

## 2023-02-28 DIAGNOSIS — M79.18 CHRONIC MYOFASCIAL PAIN: Primary | ICD-10-CM

## 2023-02-28 DIAGNOSIS — R79.82 ELEVATED C-REACTIVE PROTEIN (CRP): ICD-10-CM

## 2023-02-28 DIAGNOSIS — M25.50 HYPERMOBILITY ARTHRALGIA: ICD-10-CM

## 2023-02-28 PROCEDURE — 3079F DIAST BP 80-89 MM HG: CPT | Performed by: INTERNAL MEDICINE

## 2023-02-28 PROCEDURE — 3008F BODY MASS INDEX DOCD: CPT | Performed by: INTERNAL MEDICINE

## 2023-02-28 PROCEDURE — 99213 OFFICE O/P EST LOW 20 MIN: CPT | Performed by: INTERNAL MEDICINE

## 2023-02-28 PROCEDURE — 3074F SYST BP LT 130 MM HG: CPT | Performed by: INTERNAL MEDICINE

## 2023-02-28 PROCEDURE — 97110 THERAPEUTIC EXERCISES: CPT

## 2023-03-03 ENCOUNTER — APPOINTMENT (OUTPATIENT)
Dept: PHYSICAL THERAPY | Facility: HOSPITAL | Age: 43
End: 2023-03-03
Attending: INTERNAL MEDICINE
Payer: COMMERCIAL

## 2023-03-06 ENCOUNTER — TELEPHONE (OUTPATIENT)
Dept: PHYSICAL THERAPY | Facility: HOSPITAL | Age: 43
End: 2023-03-06

## 2023-03-06 ENCOUNTER — OFFICE VISIT (OUTPATIENT)
Dept: PHYSICAL THERAPY | Facility: HOSPITAL | Age: 43
End: 2023-03-06
Attending: INTERNAL MEDICINE
Payer: COMMERCIAL

## 2023-03-06 PROCEDURE — 97110 THERAPEUTIC EXERCISES: CPT

## 2023-03-06 PROCEDURE — 97112 NEUROMUSCULAR REEDUCATION: CPT

## 2023-03-07 ENCOUNTER — APPOINTMENT (OUTPATIENT)
Dept: PHYSICAL THERAPY | Facility: HOSPITAL | Age: 43
End: 2023-03-07
Attending: INTERNAL MEDICINE
Payer: COMMERCIAL

## 2023-03-09 ENCOUNTER — OFFICE VISIT (OUTPATIENT)
Dept: PHYSICAL THERAPY | Facility: HOSPITAL | Age: 43
End: 2023-03-09
Attending: INTERNAL MEDICINE
Payer: COMMERCIAL

## 2023-03-09 PROCEDURE — 97112 NEUROMUSCULAR REEDUCATION: CPT

## 2023-03-09 PROCEDURE — 97110 THERAPEUTIC EXERCISES: CPT

## 2023-03-10 ENCOUNTER — APPOINTMENT (OUTPATIENT)
Dept: PHYSICAL THERAPY | Facility: HOSPITAL | Age: 43
End: 2023-03-10
Attending: INTERNAL MEDICINE
Payer: COMMERCIAL

## 2023-03-13 ENCOUNTER — OFFICE VISIT (OUTPATIENT)
Dept: PHYSICAL THERAPY | Facility: HOSPITAL | Age: 43
End: 2023-03-13
Attending: INTERNAL MEDICINE
Payer: COMMERCIAL

## 2023-03-13 PROCEDURE — 97110 THERAPEUTIC EXERCISES: CPT

## 2023-03-20 ENCOUNTER — OFFICE VISIT (OUTPATIENT)
Dept: INTERNAL MEDICINE CLINIC | Facility: CLINIC | Age: 43
End: 2023-03-20
Payer: COMMERCIAL

## 2023-03-20 VITALS
WEIGHT: 202 LBS | BODY MASS INDEX: 29.92 KG/M2 | SYSTOLIC BLOOD PRESSURE: 120 MMHG | TEMPERATURE: 97 F | HEIGHT: 69 IN | RESPIRATION RATE: 16 BRPM | DIASTOLIC BLOOD PRESSURE: 84 MMHG | HEART RATE: 80 BPM

## 2023-03-20 DIAGNOSIS — M54.16 LUMBAR RADICULOPATHY: ICD-10-CM

## 2023-03-20 DIAGNOSIS — G56.02 LEFT CARPAL TUNNEL SYNDROME: ICD-10-CM

## 2023-03-20 DIAGNOSIS — J32.9 FREQUENT SINUS INFECTIONS: ICD-10-CM

## 2023-03-20 DIAGNOSIS — F41.9 ANXIETY: ICD-10-CM

## 2023-03-20 DIAGNOSIS — G40.209 PARTIAL EPILEPSY WITH IMPAIRMENT OF CONSCIOUSNESS (HCC): ICD-10-CM

## 2023-03-20 DIAGNOSIS — G43.809 OTHER MIGRAINE WITHOUT STATUS MIGRAINOSUS, NOT INTRACTABLE: Primary | ICD-10-CM

## 2023-03-20 RX ORDER — METHYLPREDNISOLONE 4 MG/1
TABLET ORAL
Qty: 1 EACH | Refills: 0 | Status: SHIPPED | OUTPATIENT
Start: 2023-03-20

## 2023-03-21 ENCOUNTER — APPOINTMENT (OUTPATIENT)
Dept: PHYSICAL THERAPY | Facility: HOSPITAL | Age: 43
End: 2023-03-21
Attending: INTERNAL MEDICINE
Payer: COMMERCIAL

## 2023-03-28 ENCOUNTER — APPOINTMENT (OUTPATIENT)
Dept: PHYSICAL THERAPY | Facility: HOSPITAL | Age: 43
End: 2023-03-28
Attending: INTERNAL MEDICINE
Payer: COMMERCIAL

## 2023-04-03 ENCOUNTER — APPOINTMENT (OUTPATIENT)
Dept: PHYSICAL THERAPY | Facility: HOSPITAL | Age: 43
End: 2023-04-03
Payer: COMMERCIAL

## 2023-04-10 ENCOUNTER — APPOINTMENT (OUTPATIENT)
Dept: PHYSICAL THERAPY | Facility: HOSPITAL | Age: 43
End: 2023-04-10
Attending: INTERNAL MEDICINE
Payer: COMMERCIAL

## 2023-05-02 ENCOUNTER — OFFICE VISIT (OUTPATIENT)
Dept: NEUROLOGY | Facility: CLINIC | Age: 43
End: 2023-05-02
Payer: COMMERCIAL

## 2023-05-02 VITALS
WEIGHT: 202 LBS | SYSTOLIC BLOOD PRESSURE: 126 MMHG | DIASTOLIC BLOOD PRESSURE: 74 MMHG | BODY MASS INDEX: 29.92 KG/M2 | HEIGHT: 69 IN

## 2023-05-02 DIAGNOSIS — G40.009 PARTIAL IDIOPATHIC EPILEPSY WITH SEIZURES OF LOCALIZED ONSET, NOT INTRACTABLE, WITHOUT STATUS EPILEPTICUS (HCC): Primary | ICD-10-CM

## 2023-05-02 PROCEDURE — 99204 OFFICE O/P NEW MOD 45 MIN: CPT | Performed by: OTHER

## 2023-05-02 PROCEDURE — 3078F DIAST BP <80 MM HG: CPT | Performed by: OTHER

## 2023-05-02 PROCEDURE — 3074F SYST BP LT 130 MM HG: CPT | Performed by: OTHER

## 2023-05-02 PROCEDURE — 3008F BODY MASS INDEX DOCD: CPT | Performed by: OTHER

## 2023-06-02 ENCOUNTER — TELEPHONE (OUTPATIENT)
Dept: SURGERY | Facility: CLINIC | Age: 43
End: 2023-06-02

## 2023-06-02 DIAGNOSIS — G40.209 PARTIAL EPILEPSY WITH IMPAIRMENT OF CONSCIOUSNESS (HCC): Primary | ICD-10-CM

## 2023-06-02 NOTE — TELEPHONE ENCOUNTER
Pt is requesting medication she has not been prescribed pt states she was advised by  to call the office out out of medication to receive Rx levetiracetam pt best call back number is [phone]

## 2023-06-05 RX ORDER — LEVETIRACETAM 1000 MG/1
1000 TABLET ORAL 2 TIMES DAILY
Qty: 180 TABLET | Refills: 1 | Status: SHIPPED | OUTPATIENT
Start: 2023-06-05

## 2023-08-01 ENCOUNTER — HOSPITAL ENCOUNTER (EMERGENCY)
Age: 43
Discharge: HOME | End: 2023-08-01
Attending: EMERGENCY MEDICINE
Payer: COMMERCIAL

## 2023-08-01 VITALS
DIASTOLIC BLOOD PRESSURE: 83 MMHG | SYSTOLIC BLOOD PRESSURE: 138 MMHG | WEIGHT: 206 LBS | RESPIRATION RATE: 16 BRPM | HEIGHT: 69 IN | OXYGEN SATURATION: 100 % | TEMPERATURE: 98 F | BODY MASS INDEX: 30.51 KG/M2 | HEART RATE: 71 BPM

## 2023-08-01 DIAGNOSIS — H10.211 CHEMICAL CONJUNCTIVITIS OF RIGHT EYE: Primary | ICD-10-CM

## 2023-08-01 PROCEDURE — 99283 EMERGENCY DEPT VISIT LOW MDM: CPT

## 2023-08-01 RX ORDER — TETRACAINE HYDROCHLORIDE 5 MG/ML
1 SOLUTION OPHTHALMIC ONCE
Status: COMPLETED | OUTPATIENT
Start: 2023-08-01 | End: 2023-08-01

## 2023-08-01 RX ORDER — TETRACAINE HYDROCHLORIDE 5 MG/ML
1 SOLUTION OPHTHALMIC ONCE
Status: DISCONTINUED | OUTPATIENT
Start: 2023-08-01 | End: 2023-08-01

## 2023-08-01 RX ORDER — CIPROFLOXACIN HYDROCHLORIDE 3.5 MG/ML
2 SOLUTION/ DROPS TOPICAL
Qty: 1 EACH | Refills: 0 | Status: SHIPPED | OUTPATIENT
Start: 2023-08-01 | End: 2023-08-06

## 2023-08-01 NOTE — ED INITIAL ASSESSMENT (HPI)
Pt states she accidentally put hydrogen peroxide based solution as her contact solution last night and put right contact into her eye and it began burning. Pt now has foreign body sensation to right eye.

## 2023-08-29 ENCOUNTER — OFFICE VISIT (OUTPATIENT)
Dept: RHEUMATOLOGY | Facility: CLINIC | Age: 43
End: 2023-08-29
Payer: COMMERCIAL

## 2023-08-29 VITALS
HEIGHT: 69 IN | HEART RATE: 71 BPM | OXYGEN SATURATION: 99 % | RESPIRATION RATE: 16 BRPM | BODY MASS INDEX: 31.4 KG/M2 | TEMPERATURE: 97 F | WEIGHT: 212 LBS | DIASTOLIC BLOOD PRESSURE: 68 MMHG | SYSTOLIC BLOOD PRESSURE: 104 MMHG

## 2023-08-29 DIAGNOSIS — M79.18 CHRONIC MYOFASCIAL PAIN: Primary | ICD-10-CM

## 2023-08-29 DIAGNOSIS — G89.29 CHRONIC MYOFASCIAL PAIN: Primary | ICD-10-CM

## 2023-08-29 DIAGNOSIS — M25.50 POLYARTHRALGIA: ICD-10-CM

## 2023-08-29 DIAGNOSIS — R53.82 CHRONIC FATIGUE: ICD-10-CM

## 2023-08-29 DIAGNOSIS — M25.50 HYPERMOBILITY ARTHRALGIA: ICD-10-CM

## 2023-08-29 PROCEDURE — 3078F DIAST BP <80 MM HG: CPT | Performed by: INTERNAL MEDICINE

## 2023-08-29 PROCEDURE — 3074F SYST BP LT 130 MM HG: CPT | Performed by: INTERNAL MEDICINE

## 2023-08-29 PROCEDURE — 3008F BODY MASS INDEX DOCD: CPT | Performed by: INTERNAL MEDICINE

## 2023-08-29 PROCEDURE — 99213 OFFICE O/P EST LOW 20 MIN: CPT | Performed by: INTERNAL MEDICINE

## 2023-08-29 RX ORDER — ACETAMINOPHEN 160 MG
2000 TABLET,DISINTEGRATING ORAL 2 TIMES DAILY
COMMUNITY

## 2023-09-06 ENCOUNTER — PATIENT MESSAGE (OUTPATIENT)
Dept: RHEUMATOLOGY | Facility: CLINIC | Age: 43
End: 2023-09-06

## 2023-09-06 DIAGNOSIS — M25.531 PAIN IN BOTH WRISTS: ICD-10-CM

## 2023-09-06 DIAGNOSIS — M25.532 PAIN IN BOTH WRISTS: ICD-10-CM

## 2023-09-06 DIAGNOSIS — M79.646 THUMB PAIN, UNSPECIFIED LATERALITY: ICD-10-CM

## 2023-09-06 DIAGNOSIS — M79.18 CHRONIC MYOFASCIAL PAIN: Primary | ICD-10-CM

## 2023-09-06 DIAGNOSIS — M65.9 TENOSYNOVITIS OF LEFT ANKLE: ICD-10-CM

## 2023-09-06 DIAGNOSIS — G89.29 CHRONIC MYOFASCIAL PAIN: Primary | ICD-10-CM

## 2023-09-06 DIAGNOSIS — M25.50 POLYARTHRALGIA: ICD-10-CM

## 2023-09-06 DIAGNOSIS — M25.50 HYPERMOBILITY ARTHRALGIA: ICD-10-CM

## 2023-09-18 ENCOUNTER — PATIENT MESSAGE (OUTPATIENT)
Dept: INTERNAL MEDICINE CLINIC | Facility: CLINIC | Age: 43
End: 2023-09-18

## 2023-09-18 DIAGNOSIS — R20.2 PARESTHESIA: Primary | ICD-10-CM

## 2023-09-19 NOTE — TELEPHONE ENCOUNTER
From: Eber Ambrocio  To: Kalia Marie  Sent: 9/18/2023 9:50 AM CDT  Subject: blood work    Hi! I know I need to get my iron checked, but I would like to get by Vit B12, Folate and Vit D checked as well. Is there a storage component also to test for. Thanks!

## 2023-09-19 NOTE — TELEPHONE ENCOUNTER
PT has Vit D order from Jan not completed yet. B12 and folate was checked in Feb - agreeable to check again at this time?

## 2023-09-25 ENCOUNTER — TELEPHONE (OUTPATIENT)
Dept: PHYSICAL THERAPY | Facility: HOSPITAL | Age: 43
End: 2023-09-25

## 2023-09-29 ENCOUNTER — OFFICE VISIT (OUTPATIENT)
Dept: OCCUPATIONAL MEDICINE | Facility: HOSPITAL | Age: 43
End: 2023-09-29
Attending: INTERNAL MEDICINE
Payer: COMMERCIAL

## 2023-09-29 DIAGNOSIS — M79.646 THUMB PAIN, UNSPECIFIED LATERALITY: Primary | ICD-10-CM

## 2023-09-29 DIAGNOSIS — M25.532 PAIN IN BOTH WRISTS: ICD-10-CM

## 2023-09-29 DIAGNOSIS — M25.531 PAIN IN BOTH WRISTS: ICD-10-CM

## 2023-09-29 PROCEDURE — 97110 THERAPEUTIC EXERCISES: CPT

## 2023-09-29 PROCEDURE — 97166 OT EVAL MOD COMPLEX 45 MIN: CPT

## 2023-10-02 ENCOUNTER — OFFICE VISIT (OUTPATIENT)
Dept: OCCUPATIONAL MEDICINE | Facility: HOSPITAL | Age: 43
End: 2023-10-02
Attending: INTERNAL MEDICINE
Payer: COMMERCIAL

## 2023-10-02 PROCEDURE — 97140 MANUAL THERAPY 1/> REGIONS: CPT

## 2023-10-02 PROCEDURE — 97035 APP MDLTY 1+ULTRASOUND EA 15: CPT

## 2023-10-02 NOTE — PROGRESS NOTES
Diagnosis:   Thumb pain, unspecified laterality (M79.646)  Pain in both wrists (G43.938,Q66.898)        Referring Provider: Naeem Kowalski  Date of Evaluation:    9/29/2023    Precautions:  none Next MD visit:   none scheduled  Date of Surgery: n/a   Insurance Primary/Secondary: JANIE       # Auth Visits: 2/8            Subjective: Patient presents to OT appt today noting similar pain symptoms although feeling kinesiotape support to thumb has assisted. Pain with movement only at this time at most 5-6/10 to ulnar wrist and thenar eminence of RUE. Objective:     ORTHOTICS: Patient utilizes oval-8 splint size 9 to R thumb and wrist cock-up brace as needed during activity. AROM (degrees): Patient noted with BUE AROM WFL. Patient has upper extremity hypermobility with tendency to hyperextend digits, and wrists. Strength (lbs) Right Average Left Average   : 53.6 lbs 57.8 lbs        Assessment: Patient following precautions to avoid hypermobility at this time and utilizing RUE thumb support. Therapist provided patient with RUE wrist cock-up brace to wear during activity and as needed for extra support and to continue with HEP exercises. Pain symptoms appear to be related to muscle strain from possible overuse. Treatment focused on conservative management, manual therapy, and use of modalities which appear to be beneficial for patient. Patient to continue to modify daily activities and complete HEP exercises. Goals: (to be met in 8 visits)   1. Patient will be independent and compliant with orthotic management support for RUE overall to facilitate proper healing and activity management. 2. Patient will report pain no greater than 2/10 for functional activity management utilizing RUE indicating increased ability to manage functional ADL/IADLs.   3. Patient will be independent with hypermobility joint precautions to avoid hyperextension of joints to facilitate proper body mechanics for daily functional activities. 4. Patient will be independent and compliant with comprehensive HEP to maintain progress achieved in OT. Plan: Patient will be seen for 2x/week or a total of 8 visits over a 90 day period. Treatment will include: Manual Therapy, Neuromuscular Re-education, Self-Care Home Management, Therapeutic Activities, Therapeutic Exercise, and Home Exercise Program instruction, Splinting, Modalities PRN    Date: 10/2/2023  TX#: 2/8 Date:                 TX#: 3/8 Date:                 TX#: 4/8 Date:                 TX#: 5/8 Date:    Tx#: 6/8   -5 min hot pack to R hand and wrist  -Wrist Cock-Up Brace Fitting  -Ultrasound treatment to R dorsal thumb w/ settings 1MHz, 100% duty, 0.8 W/cm2 for 6 min  -Ultrasound treatment to R thenar eminence w/ settings 1MHz, 100% duty, 0.8 W/cm2 for 4 min  -IASTM/STM for R thumb flexors and ECU  -Joint Mobilization of R thumb and wrist  -I-Strip 50% stretch kinesiotape support to R ECU  -Y-Strip 75% stretch kinesiotape support to R thumb CMC joint         HEP:   -Wrist and Thumb AROM Exercises     Charges: USx1, MTx2       Total Timed Treatment: 45 min  Total Treatment Time: 45 min

## 2023-10-04 ENCOUNTER — OFFICE VISIT (OUTPATIENT)
Dept: OCCUPATIONAL MEDICINE | Facility: HOSPITAL | Age: 43
End: 2023-10-04
Attending: INTERNAL MEDICINE
Payer: COMMERCIAL

## 2023-10-04 PROCEDURE — 97140 MANUAL THERAPY 1/> REGIONS: CPT

## 2023-10-04 PROCEDURE — 97035 APP MDLTY 1+ULTRASOUND EA 15: CPT

## 2023-10-04 NOTE — PROGRESS NOTES
Diagnosis:   Thumb pain, unspecified laterality (M79.646)  Pain in both wrists (H78.647,S49.913)        Referring Provider: Rayna Garcia  Date of Evaluation:    9/29/2023    Precautions:  none Next MD visit:   none scheduled  Date of Surgery: n/a   Insurance Primary/Secondary: [employer]       # Auth Visits: 3/8            Subjective: Patient presents to OT appt today noting RUE thenar eminence and ulnar side wrist pain slowly improving. At rest, not pain and at most 4-5/10 aching with movement. Patient continuing bracing and HEP exercises. Objective:     ORTHOTICS: Patient utilizes oval-8 splint size 9 to R thumb and wrist cock-up brace as needed during activity. Wrist widget fabricated for patient today to wear as alternative wrist support. AROM (degrees): Patient noted with BUE AROM WFL. Patient has upper extremity hypermobility with tendency to hyperextend digits, and wrists. Strength (lbs) Right Average Left Average   : 53.6 lbs 57.8 lbs        Assessment: Patient RUE wrist and thumb pain appears to be improving at this time with patient utilizing bracing and HEP exercises. Wrist widget fabricated for patient to use as alternative wrist support as patient notes she needs wrist support less bulky to wear when caring for her children. Goals: (to be met in 8 visits)   1. Patient will be independent and compliant with orthotic management support for RUE overall to facilitate proper healing and activity management. 2. Patient will report pain no greater than 2/10 for functional activity management utilizing RUE indicating increased ability to manage functional ADL/IADLs. 3. Patient will be independent with hypermobility joint precautions to avoid hyperextension of joints to facilitate proper body mechanics for daily functional activities. 4. Patient will be independent and compliant with comprehensive HEP to maintain progress achieved in OT.     Plan: Patient will be seen for 2x/week or a total of 8 visits over a 90 day period. Treatment will include: Manual Therapy, Neuromuscular Re-education, Self-Care Home Management, Therapeutic Activities, Therapeutic Exercise, and Home Exercise Program instruction, Splinting, Modalities PRN    Date: 10/2/2023  TX#: 2/8 Date: 10/4/2023             TX#: 3/8 Date:                 TX#: 4/8 Date:                 TX#: 5/8 Date:    Tx#: 6/8   -5 min hot pack to R hand and wrist  -Wrist Cock-Up Brace Fitting  -Ultrasound treatment to R dorsal thumb w/ settings 1MHz, 100% duty, 0.8 W/cm2 for 6 min  -Ultrasound treatment to R thenar eminence w/ settings 1MHz, 100% duty, 0.8 W/cm2 for 4 min  -IASTM/STM for R thumb flexors and ECU  -Joint Mobilization of R thumb and wrist  -I-Strip 50% stretch kinesiotape support to R ECU  -Y-Strip 75% stretch kinesiotape support to R thumb CMC joint -5 min hot pack to R hand and wrist  -Ultrasound treatment to R dorsal thumb w/ settings 1MHz, 100% duty, 0.8 W/cm2 for 6 min  -Ultrasound treatment to R thenar eminence w/ settings 1MHz, 100% duty, 0.8 W/cm2 for 4 min  -IASTM/STM for R thumb flexors and ECU  -Fabrication of Wrist Widget for RUE  -I-Strip 50% stretch kinesiotape support to R ECU  -Y-Strip 75% stretch kinesiotape support to R thumb CMC joint        HEP:   -Wrist and Thumb AROM Exercises     Charges: USx1, MTx2       Total Timed Treatment: 45 min  Total Treatment Time: 45 min

## 2023-10-10 ENCOUNTER — OFFICE VISIT (OUTPATIENT)
Dept: OCCUPATIONAL MEDICINE | Facility: HOSPITAL | Age: 43
End: 2023-10-10
Attending: INTERNAL MEDICINE
Payer: COMMERCIAL

## 2023-10-10 PROCEDURE — 97035 APP MDLTY 1+ULTRASOUND EA 15: CPT

## 2023-10-10 PROCEDURE — 97140 MANUAL THERAPY 1/> REGIONS: CPT

## 2023-10-10 NOTE — PROGRESS NOTES
Diagnosis:   Thumb pain, unspecified laterality (M79.646)  Pain in both wrists (T76.100,R73.035)        Referring Provider: Leatha Garcia  Date of Evaluation:    9/29/2023    Precautions:  none Next MD visit:   none scheduled  Date of Surgery: n/a   Insurance Primary/Secondary: [employer]IG[employer]       # Auth Visits: 4/8            Subjective: Patient presents to OT appt today noting she has been bracing during activity and feeling R ulnar side wrist and thumb flexors feeling better overall. Intermittent pain with movement only at worst 7/10 more consistently 3/10 rated. Objective:     ORTHOTICS: Patient utilizes oval-8 splint size 9 to R thumb and wrist cock-up brace as needed during activity. Wrist widget fabricated for patient today to wear as alternative wrist support. AROM (degrees): Patient noted with BUE AROM WFL. Patient has upper extremity hypermobility with tendency to hyperextend digits, and wrists. Strength (lbs) Right Average Left Average   : 53.6 lbs 57.8 lbs        Assessment: Patient progressing overall with RUE wrist and digit management. Patient to continue with bracing and ROM exercises and to slowly integrate light strengthening as tolerated as pain symptoms improve over the next few sessions. Goals: (to be met in 8 visits)   1. Patient will be independent and compliant with orthotic management support for RUE overall to facilitate proper healing and activity management. 2. Patient will report pain no greater than 2/10 for functional activity management utilizing RUE indicating increased ability to manage functional ADL/IADLs. 3. Patient will be independent with hypermobility joint precautions to avoid hyperextension of joints to facilitate proper body mechanics for daily functional activities. 4. Patient will be independent and compliant with comprehensive HEP to maintain progress achieved in OT. Plan: Patient will be seen for 2x/week or a total of 8 visits over a 90 day period. Treatment will include: Manual Therapy, Neuromuscular Re-education, Self-Care Home Management, Therapeutic Activities, Therapeutic Exercise, and Home Exercise Program instruction, Splinting, Modalities PRN    Date: 10/2/2023  TX#: 2/8 Date: 10/4/2023             TX#: 3/8 Date: 10/10/2023           TX#: 4/8 Date:                 TX#: 5/8 Date:    Tx#: 6/8   -5 min hot pack to R hand and wrist  -Wrist Cock-Up Brace Fitting  -Ultrasound treatment to R dorsal thumb w/ settings 1MHz, 100% duty, 0.8 W/cm2 for 6 min  -Ultrasound treatment to R thenar eminence w/ settings 1MHz, 100% duty, 0.8 W/cm2 for 4 min  -IASTM/STM for R thumb flexors and ECU  -Joint Mobilization of R thumb and wrist  -I-Strip 50% stretch kinesiotape support to R ECU  -Y-Strip 75% stretch kinesiotape support to R thumb CMC joint -5 min hot pack to R hand and wrist  -Ultrasound treatment to R dorsal thumb w/ settings 1MHz, 100% duty, 0.8 W/cm2 for 6 min  -Ultrasound treatment to R thenar eminence w/ settings 1MHz, 100% duty, 0.8 W/cm2 for 4 min  -IASTM/STM for R thumb flexors and ECU  -Fabrication of Wrist Widget for RUE  -I-Strip 50% stretch kinesiotape support to R ECU  -Y-Strip 75% stretch kinesiotape support to R thumb CMC joint 5 min hot pack to R hand and wrist  -Ultrasound treatment to R dorsal thumb w/ settings 1MHz, 100% duty, 0.8 W/cm2 for 6 min  -Ultrasound treatment to R thenar eminence w/ settings 1MHz, 100% duty, 0.8 W/cm2 for 4 min  -IASTM/STM for R thumb flexors and ECU  -Joint Mobilization to RUE wrist and digits       HEP:   -Wrist and Thumb AROM Exercises     Charges: USx1, MTx1       Total Timed Treatment: 30 min  Total Treatment Time: 30 min

## 2023-10-13 ENCOUNTER — OFFICE VISIT (OUTPATIENT)
Dept: OCCUPATIONAL MEDICINE | Facility: HOSPITAL | Age: 43
End: 2023-10-13
Attending: INTERNAL MEDICINE
Payer: COMMERCIAL

## 2023-10-13 PROCEDURE — 97140 MANUAL THERAPY 1/> REGIONS: CPT

## 2023-10-13 PROCEDURE — 97035 APP MDLTY 1+ULTRASOUND EA 15: CPT

## 2023-10-13 NOTE — PROGRESS NOTES
Diagnosis:   Thumb pain, unspecified laterality (M79.646)  Pain in both wrists (R80.785,S48.399)        Referring Provider: David Wolff  Date of Evaluation:    9/29/2023    Precautions:  none Next MD visit:   none scheduled  Date of Surgery: n/a   Insurance Primary/Secondary: JANIE       # Auth Visits: 5/8            Subjective: Patient presents to OT appt today noting she has been completing HEP exercises. Aching pain 4/10 a bit worse over the past few days to ulnar side wrist and thumb flexors although patient has been more cognizant of positioning and activity management. Objective:     ORTHOTICS: Patient utilizes oval-8 splint size 9 to R thumb and wrist cock-up brace as needed during activity. Wrist widget fabricated for patient today to wear as alternative wrist support. AROM (degrees): Patient noted with BUE AROM WFL. Patient has upper extremity hypermobility with tendency to hyperextend digits, and wrists. Strength (lbs) Right Average Left Average   : 53.6 lbs 57.8 lbs        Assessment: Patient progressing overall with RUE wrist and digit management. Patient to continue with bracing and ROM exercises and to slowly integrate light strengthening as tolerated as pain symptoms improve over the next few sessions. Goals: (to be met in 8 visits)   1. Patient will be independent and compliant with orthotic management support for RUE overall to facilitate proper healing and activity management. 2. Patient will report pain no greater than 2/10 for functional activity management utilizing RUE indicating increased ability to manage functional ADL/IADLs. 3. Patient will be independent with hypermobility joint precautions to avoid hyperextension of joints to facilitate proper body mechanics for daily functional activities. 4. Patient will be independent and compliant with comprehensive HEP to maintain progress achieved in OT.     Plan: Patient will be seen for 2x/week or a total of 8 visits over a 90 day period. Treatment will include: Manual Therapy, Neuromuscular Re-education, Self-Care Home Management, Therapeutic Activities, Therapeutic Exercise, and Home Exercise Program instruction, Splinting, Modalities PRN    Date: 10/2/2023  TX#: 2/8 Date: 10/4/2023             TX#: 3/8 Date: 10/10/2023           TX#: 4/8 Date: 10/13/2023           TX#: 5/8 Date:    Tx#: 6/8   -5 min hot pack to R hand and wrist  -Wrist Cock-Up Brace Fitting  -Ultrasound treatment to R dorsal thumb w/ settings 1MHz, 100% duty, 0.8 W/cm2 for 6 min  -Ultrasound treatment to R thenar eminence w/ settings 1MHz, 100% duty, 0.8 W/cm2 for 4 min  -IASTM/STM for R thumb flexors and ECU  -Joint Mobilization of R thumb and wrist  -I-Strip 50% stretch kinesiotape support to R ECU  -Y-Strip 75% stretch kinesiotape support to R thumb CMC joint -5 min hot pack to R hand and wrist  -Ultrasound treatment to R dorsal thumb w/ settings 1MHz, 100% duty, 0.8 W/cm2 for 6 min  -Ultrasound treatment to R thenar eminence w/ settings 1MHz, 100% duty, 0.8 W/cm2 for 4 min  -IASTM/STM for R thumb flexors and ECU  -Fabrication of Wrist Widget for RUE  -I-Strip 50% stretch kinesiotape support to R ECU  -Y-Strip 75% stretch kinesiotape support to R thumb CMC joint -5 min hot pack to R hand and wrist  -Ultrasound treatment to R dorsal thumb w/ settings 1MHz, 100% duty, 0.8 W/cm2 for 6 min  -Ultrasound treatment to R thenar eminence w/ settings 1MHz, 100% duty, 0.8 W/cm2 for 4 min  -IASTM/STM for R thumb flexors and ECU  -Joint Mobilization to RUE wrist and digits 5 min hot pack to R hand and wrist  -Ultrasound treatment to R dorsal thumb w/ settings 1MHz, 100% duty, 0.8 W/cm2 for 6 min  -Ultrasound treatment to R thenar eminence w/ settings 1MHz, 100% duty, 0.8 W/cm2 for 4 min  -IASTM/STM for R thumb flexors and ECU  -Joint Mobilization to RUE wrist and digits      HEP:   -Wrist and Thumb AROM Exercises     Charges: USx1, MTx1       Total Timed Treatment: 30 min  Total Treatment Time: 30 min

## 2023-10-16 ENCOUNTER — OFFICE VISIT (OUTPATIENT)
Dept: OCCUPATIONAL MEDICINE | Facility: HOSPITAL | Age: 43
End: 2023-10-16
Attending: INTERNAL MEDICINE
Payer: COMMERCIAL

## 2023-10-16 PROCEDURE — 97035 APP MDLTY 1+ULTRASOUND EA 15: CPT

## 2023-10-16 PROCEDURE — 97140 MANUAL THERAPY 1/> REGIONS: CPT

## 2023-10-16 NOTE — PROGRESS NOTES
Diagnosis:   Thumb pain, unspecified laterality (M79.646)  Pain in both wrists (M23.513,G91.371)        Referring Provider: Myra Crespo  Date of Evaluation:    9/29/2023    Precautions:  none Next MD visit:   none scheduled  Date of Surgery: n/a   Insurance Primary/Secondary: JANIE       # Auth Visits: 6/8            Subjective: Patient presents to OT appt today noting she has been feeling well but overall still at times 3-4/10 achiness around ulnar side wrist and thumb today only with activity. Objective:     *patient provided foam built-up  universal tubes to help with gripping writing utensils    ORTHOTICS: Patient utilizes oval-8 splint size 9 to R thumb and wrist cock-up brace as needed during activity. Wrist widget fabricated for patient today to wear as alternative wrist support. Patient provided R thumb metagrip splint as needed. AROM (degrees): Patient noted with BUE AROM WFL. Patient has upper extremity hypermobility with tendency to hyperextend digits, and wrists. Strength (lbs) Right Average Left Average   : 53.6 lbs 57.8 lbs        Assessment: Patient progressing overall with RUE wrist and digit management. Patient to continue with bracing and ROM exercises and to slowly integrate light strengthening as tolerated as pain symptoms improve over the next few sessions. Goals: (to be met in 8 visits)   1. Patient will be independent and compliant with orthotic management support for RUE overall to facilitate proper healing and activity management. 2. Patient will report pain no greater than 2/10 for functional activity management utilizing RUE indicating increased ability to manage functional ADL/IADLs. 3. Patient will be independent with hypermobility joint precautions to avoid hyperextension of joints to facilitate proper body mechanics for daily functional activities. 4. Patient will be independent and compliant with comprehensive HEP to maintain progress achieved in OT.     Plan: Patient will be seen for 2x/week or a total of 8 visits over a 90 day period.   Treatment will include: Manual Therapy, Neuromuscular Re-education, Self-Care Home Management, Therapeutic Activities, Therapeutic Exercise, and Home Exercise Program instruction, Splinting, Modalities PRN    Date: 10/2/2023  TX#: 2/8 Date: 10/4/2023             TX#: 3/8 Date: 10/10/2023           TX#: 4/8 Date: 10/13/2023           TX#: 5/8 Date: 10/16/2023  Tx#: 6/8   -5 min hot pack to R hand and wrist  -Wrist Cock-Up Brace Fitting  -Ultrasound treatment to R dorsal thumb w/ settings 1MHz, 100% duty, 0.8 W/cm2 for 6 min  -Ultrasound treatment to R thenar eminence w/ settings 1MHz, 100% duty, 0.8 W/cm2 for 4 min  -IASTM/STM for R thumb flexors and ECU  -Joint Mobilization of R thumb and wrist  -I-Strip 50% stretch kinesiotape support to R ECU  -Y-Strip 75% stretch kinesiotape support to R thumb CMC joint -5 min hot pack to R hand and wrist  -Ultrasound treatment to R dorsal thumb w/ settings 1MHz, 100% duty, 0.8 W/cm2 for 6 min  -Ultrasound treatment to R thenar eminence w/ settings 1MHz, 100% duty, 0.8 W/cm2 for 4 min  -IASTM/STM for R thumb flexors and ECU  -Fabrication of Wrist Widget for RUE  -I-Strip 50% stretch kinesiotape support to R ECU  -Y-Strip 75% stretch kinesiotape support to R thumb CMC joint -5 min hot pack to R hand and wrist  -Ultrasound treatment to R dorsal thumb w/ settings 1MHz, 100% duty, 0.8 W/cm2 for 6 min  -Ultrasound treatment to R thenar eminence w/ settings 1MHz, 100% duty, 0.8 W/cm2 for 4 min  -IASTM/STM for R thumb flexors and ECU  -Joint Mobilization to RUE wrist and digits -5 min hot pack to R hand and wrist  -Ultrasound treatment to R dorsal thumb w/ settings 1MHz, 100% duty, 0.8 W/cm2 for 6 min  -Ultrasound treatment to R thenar eminence w/ settings 1MHz, 100% duty, 0.8 W/cm2 for 4 min  -IASTM/STM for R thumb flexors and ECU  -Joint Mobilization to RUE wrist and digits -5 min hot pack to R hand and wrist  -Ultrasound treatment to R dorsal thumb w/ settings 1MHz, 100% duty, 0.8 W/cm2 for 6 min  -Ultrasound treatment to R thenar eminence w/ settings 1MHz, 100% duty, 0.8 W/cm2 for 4 min  -IASTM/STM for R thumb flexors and ECU  -Joint Mobilization to RUE wrist and digits  -Metagrip Thumb Splint Fitting  -Built-Up  Foam tube patient education     HEP:   -Wrist and Thumb AROM Exercises     Charges: USx1, MTx2       Total Timed Treatment: 45 min  Total Treatment Time: 45 min

## 2023-10-18 ENCOUNTER — OFFICE VISIT (OUTPATIENT)
Dept: OCCUPATIONAL MEDICINE | Facility: HOSPITAL | Age: 43
End: 2023-10-18
Attending: INTERNAL MEDICINE
Payer: COMMERCIAL

## 2023-10-18 PROCEDURE — 97140 MANUAL THERAPY 1/> REGIONS: CPT

## 2023-10-18 PROCEDURE — 97035 APP MDLTY 1+ULTRASOUND EA 15: CPT

## 2023-10-18 NOTE — PROGRESS NOTES
Diagnosis:   Thumb pain, unspecified laterality (M79.646)  Pain in both wrists (L00.795,M34.849)        Referring Provider: Cathy Zavala  Date of Evaluation:    9/29/2023    Precautions:  none Next MD visit:   none scheduled  Date of Surgery: n/a   Insurance Primary/Secondary: JANIE       # Auth Visits: 7/8            Subjective: Patient presents to OT appt today noting her R wrist and thumb have been feeling better. Patient notes she is able to do laundry and assist with showering son more independently. Patient notes intermittent aching that can reach up to 6/10 if aggravated but that she no longer feels constant pain. Objective:     ORTHOTICS: Patient utilizes oval-8 splint size 9 to R thumb and wrist cock-up brace as needed during activity. Wrist widget fabricated for patient today to wear as alternative wrist support. Patient provided R thumb metagrip splint as needed. AROM (degrees): Patient noted with BUE AROM WFL. Patient has upper extremity hypermobility with tendency to hyperextend digits, and wrists. Strength (lbs) Right Average Left Average   : 53.6 lbs 57.8 lbs        Assessment: Patient appears to be improving in terms of R wrist and thumb pain management with treatment including bracing, activity modifications, use of modalities, gentle ROM stretches, and joint stability management. Goals: (to be met in 8 visits)   1. Patient will be independent and compliant with orthotic management support for RUE overall to facilitate proper healing and activity management. 2. Patient will report pain no greater than 2/10 for functional activity management utilizing RUE indicating increased ability to manage functional ADL/IADLs. 3. Patient will be independent with hypermobility joint precautions to avoid hyperextension of joints to facilitate proper body mechanics for daily functional activities.   4. Patient will be independent and compliant with comprehensive HEP to maintain progress achieved in OT.    Plan: Patient will be seen for 2x/week or a total of 8 visits over a 90 day period.   Treatment will include: Manual Therapy, Neuromuscular Re-education, Self-Care Home Management, Therapeutic Activities, Therapeutic Exercise, and Home Exercise Program instruction, Splinting, Modalities PRN    Date: 10/10/2023           TX#: 4/8 Date: 10/13/2023           TX#: 5/8 Date: 10/16/2023  Tx#: 6/8 Date: 10/18/2023  TX#: 7/8   -5 min hot pack to R hand and wrist  -Ultrasound treatment to R dorsal thumb w/ settings 1MHz, 100% duty, 0.8 W/cm2 for 6 min  -Ultrasound treatment to R thenar eminence w/ settings 1MHz, 100% duty, 0.8 W/cm2 for 4 min  -IASTM/STM for R thumb flexors and ECU  -Joint Mobilization to RUE wrist and digits -5 min hot pack to R hand and wrist  -Ultrasound treatment to R dorsal thumb w/ settings 1MHz, 100% duty, 0.8 W/cm2 for 6 min  -Ultrasound treatment to R thenar eminence w/ settings 1MHz, 100% duty, 0.8 W/cm2 for 4 min  -IASTM/STM for R thumb flexors and ECU  -Joint Mobilization to RUE wrist and digits -5 min hot pack to R hand and wrist  -Ultrasound treatment to R dorsal thumb w/ settings 1MHz, 100% duty, 0.8 W/cm2 for 6 min  -Ultrasound treatment to R thenar eminence w/ settings 1MHz, 100% duty, 0.8 W/cm2 for 4 min  -IASTM/STM for R thumb flexors and ECU  -Joint Mobilization to RUE wrist and digits  -Metagrip Thumb Splint Fitting  -Built-Up  Foam tube patient education -5 min hot pack to R hand and wrist  -Ultrasound treatment to R dorsal thumb w/ settings 1MHz, 100% duty, 0.8 W/cm2 for 6 min  -Ultrasound treatment to R thenar eminence w/ settings 1MHz, 100% duty, 0.8 W/cm2 for 4 min  -IASTM/STM for R thumb flexors and ECU  -Joint Mobilization to RUE wrist and digits  -Review of Adaptive Equipment for Hypermobility     HEP:   -Wrist and Thumb AROM Exercises     Charges: USx1, MTx2       Total Timed Treatment: 40 min  Total Treatment Time: 40 min

## 2023-10-23 ENCOUNTER — OFFICE VISIT (OUTPATIENT)
Dept: OCCUPATIONAL MEDICINE | Facility: HOSPITAL | Age: 43
End: 2023-10-23
Attending: INTERNAL MEDICINE
Payer: COMMERCIAL

## 2023-10-23 PROCEDURE — 97110 THERAPEUTIC EXERCISES: CPT

## 2023-10-23 NOTE — PROGRESS NOTES
Discharge Summary  Diagnosis:   Thumb pain, unspecified laterality (M79.646)  Pain in both wrists (Y43.737,F64.271)        Referring Provider: Petr Baron  Date of Evaluation:    9/29/2023    Precautions:  none Next MD visit:   none scheduled  Date of Surgery: n/a   Insurance Primary/Secondary: [employer]       # Auth Visits: 8/8          Pt has attended 8 visits in Occupational Therapy. Subjective: Patient presents to OT appt today noting her R wrist has been feeling better lately with little to no pain. Patient equipped with bracing, exercises, and adaptive equipment recommendations and reports feeling confident with all recommendations provided. Assessment: Patient has progressed well with therapy goals reporting decreased pain to R wrist overall. Patient provided bracing options, adaptive equipment recommendations to address hypermobility, and is independent with all exercises and recommendations at this time. Patient would be appropriate to discharge from formal OT and to continue at home at this time. Objective:     ORTHOTICS: Patient utilizes oval-8 splint size 9 to R thumb and wrist cock-up brace as needed during activity. Wrist widget fabricated for patient today to wear as alternative wrist support. Patient provided R thumb metagrip splint as needed. AROM (degrees): Patient noted with BUE AROM WFL. Patient has upper extremity hypermobility with tendency to hyperextend digits, and wrists. Strength (lbs) Right Average Left Average   : 53.6 lbs 57.8 lbs        Goals: (to be met in 8 visits)   1. Patient will be independent and compliant with orthotic management support for RUE overall to facilitate proper healing and activity management. MET  2. Patient will report pain no greater than 2/10 for functional activity management utilizing RUE indicating increased ability to manage functional ADL/IADLs. MET  3.  Patient will be independent with hypermobility joint precautions to avoid hyperextension of joints to facilitate proper body mechanics for daily functional activities. MET  4. Patient will be independent and compliant with comprehensive HEP to maintain progress achieved in OT. MET    Plan: Patient to discharge and continue with recommendations and HEP. Patient/Family/Caregiver was advised of these findings, precautions, and treatment options and has agreed to actively participate in planning and for this course of care. Thank you for your referral. If you have any questions, please contact me at Dept: [phone].     Sincerely,  Electronically signed by therapist: Ye Araujo OT      Certification From: 70/26/1471  To:1/21/2024      Date: 10/13/2023           TX#: 5/8 Date: 10/16/2023  Tx#: 6/8 Date: 10/18/2023  TX#: 7/8 Date: 10/23/2023  TX#: 8/8   -5 min hot pack to R hand and wrist  -Ultrasound treatment to R dorsal thumb w/ settings 1MHz, 100% duty, 0.8 W/cm2 for 6 min  -Ultrasound treatment to R thenar eminence w/ settings 1MHz, 100% duty, 0.8 W/cm2 for 4 min  -IASTM/STM for R thumb flexors and ECU  -Joint Mobilization to RUE wrist and digits -5 min hot pack to R hand and wrist  -Ultrasound treatment to R dorsal thumb w/ settings 1MHz, 100% duty, 0.8 W/cm2 for 6 min  -Ultrasound treatment to R thenar eminence w/ settings 1MHz, 100% duty, 0.8 W/cm2 for 4 min  -IASTM/STM for R thumb flexors and ECU  -Joint Mobilization to RUE wrist and digits  -Metagrip Thumb Splint Fitting  -Built-Up  Foam tube patient education -5 min hot pack to R hand and wrist  -Ultrasound treatment to R dorsal thumb w/ settings 1MHz, 100% duty, 0.8 W/cm2 for 6 min  -Ultrasound treatment to R thenar eminence w/ settings 1MHz, 100% duty, 0.8 W/cm2 for 4 min  -IASTM/STM for R thumb flexors and ECU  -Joint Mobilization to RUE wrist and digits  -Review of Adaptive Equipment for Hypermobility -5 min hot pack to R hand and wrist  -IASTM/STM for R thumb flexors and ECU  -Joint Mobilization to RUE wrist and digits  -Yellow Theraputty Strengthening Exercises  -1 lb Weight/Hammer Wrist Strengthening Eccentric Exercises     HEP:   -Wrist and Thumb AROM Exercises  -Yellow Theraputty Strengthening Exercises  -1lb/2lb Weight Eccentric Wrist Exercises    Charges: TEx2       Total Timed Treatment: 30 min  Total Treatment Time: 30 min

## 2023-10-26 LAB
% SATURATION: 33 % (CALC) (ref 16–45)
FERRITIN: 6 NG/ML (ref 16–232)
IRON BINDING CAPACITY: 335 MCG/DL (CALC) (ref 250–450)
IRON, TOTAL: 110 MCG/DL (ref 40–190)

## 2023-11-06 ENCOUNTER — TELEPHONE (OUTPATIENT)
Dept: INTERNAL MEDICINE CLINIC | Facility: CLINIC | Age: 43
End: 2023-11-06

## 2023-11-06 DIAGNOSIS — Z13.228 SCREENING FOR METABOLIC DISORDER: ICD-10-CM

## 2023-11-06 DIAGNOSIS — Z00.00 ROUTINE GENERAL MEDICAL EXAMINATION AT A HEALTH CARE FACILITY: Primary | ICD-10-CM

## 2023-11-06 DIAGNOSIS — Z13.0 SCREENING FOR BLOOD DISEASE: ICD-10-CM

## 2023-11-06 DIAGNOSIS — Z13.29 SCREENING FOR THYROID DISORDER: ICD-10-CM

## 2023-11-06 DIAGNOSIS — Z13.220 SCREENING FOR LIPID DISORDERS: ICD-10-CM

## 2023-11-06 NOTE — TELEPHONE ENCOUNTER
Future Appointments   Date Time Provider Zachary Gan   12/18/2023  8:40 AM Audra Escalante PA-C [facility]     Informed must fast no call back required.  Orders to [facility]

## 2023-11-07 ENCOUNTER — OFFICE VISIT (OUTPATIENT)
Dept: NEUROLOGY | Facility: CLINIC | Age: 43
End: 2023-11-07
Payer: COMMERCIAL

## 2023-11-07 VITALS — SYSTOLIC BLOOD PRESSURE: 127 MMHG | DIASTOLIC BLOOD PRESSURE: 83 MMHG

## 2023-11-07 DIAGNOSIS — G40.009 PARTIAL IDIOPATHIC EPILEPSY WITH SEIZURES OF LOCALIZED ONSET, NOT INTRACTABLE, WITHOUT STATUS EPILEPTICUS (HCC): Primary | ICD-10-CM

## 2023-11-07 DIAGNOSIS — G40.209 PARTIAL EPILEPSY WITH IMPAIRMENT OF CONSCIOUSNESS (HCC): ICD-10-CM

## 2023-11-07 PROCEDURE — 3074F SYST BP LT 130 MM HG: CPT | Performed by: OTHER

## 2023-11-07 PROCEDURE — 3079F DIAST BP 80-89 MM HG: CPT | Performed by: OTHER

## 2023-11-07 PROCEDURE — 99214 OFFICE O/P EST MOD 30 MIN: CPT | Performed by: OTHER

## 2023-11-07 RX ORDER — LEVETIRACETAM 1000 MG/1
1000 TABLET ORAL 2 TIMES DAILY
Qty: 180 TABLET | Refills: 3 | Status: SHIPPED | OUTPATIENT
Start: 2023-11-07

## 2023-11-07 NOTE — PROGRESS NOTES
HPI:    Patient ID: Lora Cherry is a 38year old female. PCP: Deepti Manning PA-C    HPI  Patient is a 77-year-old female who presented for follow up for seizure disorder. Clinically in remission and no seizures for several years  C/o cluster of migraines around Halloween time. She was diagnosed with seizures at the age of 13 and had absence and generalized seizures. She has been on different medications-  Carbamazepine, Depakote then in  switched to [address] as she wanted to have kids. Usually seizure will start with vision that gets fuzzy, LOC, spacing out and had generalized seizures as well      Migraines- patient states she has migraines and HA's. Migraines are weather and smell related. States she also has stress and tension migraine/HA. Previously took Imitrex, but it  no longer benefits her. Varies in frequency,more in spring season. She takes Advil as needed and works well along with coffee, rest and heating pad. Family history: daughter gets migraines and had abnormal EEG.  One son with autism    HISTORY:  Past Medical History:   Diagnosis Date    ASTHMA     Epilepsy ([facility] [address]     HEADACHES     migraines      Past Surgical History:   Procedure Laterality Date      x 2    REPAIR OF [place],HERO  2012    Procedure: TOTAL METATARSAL PHALANGEAL JOINT REPLACEMENT;  Surgeon: Liborio Dong DPM;  Location: [address] [place] [address] TEETH REMOVED        Family History   Problem Relation Age of Onset    Hypertension Father     Lipids Father     Obesity Father     Other (Other) Father         emphysema     Hypertension Mother     Heart Disorder Mother         heart murmur     Other (osteoporosis) Mother     Cancer Paternal Grandfather         lung cancer     Other (lung cancer) Paternal Grandfather     Other (osteoporosis) Maternal Grandmother     Other (Other) Maternal Grandfather         alzheimers       Social History     Socioeconomic History    Marital status:  Tobacco Use    Smoking status: Never    Smokeless tobacco: Never   Vaping Use    Vaping Use: Never used   Substance and Sexual Activity    Alcohol use: Yes     Comment: social    Drug use: No    Sexual activity: Yes     Partners: Male     Birth control/protection: Vasectomy        Review of Systems   Constitutional: Negative. HENT: Negative. Eyes: Negative. Respiratory: Negative. Cardiovascular: Negative. Gastrointestinal: Negative. Endocrine: Negative. Genitourinary: Negative. Musculoskeletal: Negative. Skin: Negative. Allergic/Immunologic: Negative. Neurological:  Positive for seizures. Hematological: Negative. Psychiatric/Behavioral: Negative. All other systems reviewed and are negative. Current Outpatient Medications   Medication Sig Dispense Refill    cholecalciferol 50 MCG (2000 UT) Oral Cap Take 1 capsule (2,000 Units total) by mouth in the morning and 1 capsule (2,000 Units total) before bedtime. levetiracetam (KEPPRA) 1000 MG Oral Tab Take 1 tablet (1,000 mg total) by mouth 2 (two) times daily. 180 tablet 1    predniSONE 20 MG Oral Tab TAKE 2 TABLETS BY MOUTH EVERY DAY FOR 3 DAYS THEN AS DIRECTED      Multiple Vitamin (MULTI-VITAMIN DAILY) Oral Tab Take by mouth. EPINEPHrine 0.3 MG/0.3ML Injection Solution Auto-injector       Fluticasone Propionate 50 MCG/ACT Nasal Suspension 2 sprays by Each Nare route daily. 3 Bottle 3    Albuterol Sulfate  (90 Base) MCG/ACT Inhalation Aero Soln INHA E 1 TO 2 PUFFS EVERY 4 TO 6 HOURS AS NEEDED 1 Inhaler 2    Ascorbic Acid (VITAMIN C) 100 MG Oral Tab Take 1 tablet (100 mg total) by mouth daily. methylPREDNISolone (MEDROL) 4 MG Oral Tablet Therapy Pack Take as directed. (Patient not taking: Reported on 8/29/2023) 1 each 0    Betamethasone Dipropionate Aug 0.05 % External Cream Apply to affected skin bid prn (Patient not taking: Reported on 11/7/2023) 60 g 1     Allergies:   Allergies   Allergen Reactions    Bees SWELLING    Other SWELLING     Bug bites    Antihistamine [Dexbrompheniramine-Pseudoeph]      All antihistamines interfere with epilepsy meds    Dilantin [Phenytoin Sodium]      Interferes with epilepsy meds     PHYSICAL EXAM:   Physical Exam  Blood pressure 127/83, last menstrual period 07/27/2023, not currently breastfeeding. General Appearance: Well nourished, well developed, no apparent distress. HEENT: Normocephalic and atraumatic. Normal sclera. Neck: Normal range of motion. Neck supple. Cardiovascular: Normal rate, regular rhythm and normal heart sounds. Pulmonary/Chest: Effort normal and breath sounds normal.   Abdominal: Soft. Bowel sounds are normal.   Psych: normal mood and affect    Neurological  Awake, alert and well oriented. Cranial Nerves: II: Visual acuity: normal  II: Visual fields: normal  III: Pupils: equal, round, reactive to light  III,IV,VI: Extra Ocular Movements: intact  V: Facial sensation: intact  VII: Facial strength: intact  VIII: Hearing: intact  IX: Palate: intact  XI: Shoulder shrug: intact  XII: Tongue movement: normal    Motor: Normal tone. Strength is  5 out of 5 in all extremities bilaterally. Sensory: Sensory examination is normal to light touch and pinprick     Coordination: Finger-to-nose normal bilaterally without evidence of dysmetria.     Gait: normal casual gait       TESTS/IMAGING:         Component      Latest Ref Rng 2/6/2023   Glucose      65 - 99 mg/dL 74    BUN      7 - 25 mg/dL 16    CREATININE      0.50 - 0.99 mg/dL 0.77    EGFR      > OR = 60 mL/min/1.73m2 97    BUN/CREATININE RATIO      6 - 22 (calc) NOT APPLICABLE    Sodium      135 - 146 mmol/L 143    Potassium      3.5 - 5.3 mmol/L 4.9    Chloride      98 - 110 mmol/L 106    Carbon Dioxide, Total      20 - 32 mmol/L 30    CALCIUM      8.6 - 10.2 mg/dL 9.3    PROTEIN, TOTAL      6.1 - 8.1 g/dL 6.2    Albumin      3.6 - 5.1 g/dL 3.9    Globulin      1.9 - 3.7 g/dL (calc) 2.3 A/G Ratio      1.0 - 2.5 (calc) 1.7    Total Bilirubin      0.2 - 1.2 mg/dL 0.3    Alkaline Phosphatase      31 - 125 U/L 69    AST (SGOT)      10 - 30 U/L 17    ALT (SGPT)      6 - 29 U/L 13            ASSESSMENT/PLAN:       ICD-10-CM    1. Partial idiopathic epilepsy with seizures of localized onset, not intractable, without status epilepticus ([facility] [address]  G40.009       2. Partial epilepsy with impairment of consciousness (HCC)  G40.209 levetiracetam (KEPPRA) 1000 MG Oral Tab          Clinically in remission but patient would like to continue medications  Continue Keppra 1000 mg BID    Migraines with varying frequency  Discussed preventive therapy but patient not interested  Continue Advil migraine prn    May consider Medrol  dose pack for status migrainosus      Follow up in about 12 months  See orders and medications filed with this encounter. The patient indicates understanding of these issues and agrees with the plan.       Parish Medina MD  [address] [place] [address] This Visit:  Requested Prescriptions      No prescriptions requested or ordered in this encounter       Imaging & Referrals:  None     DA#[id number]

## 2023-11-29 ENCOUNTER — PATIENT MESSAGE (OUTPATIENT)
Dept: NEUROLOGY | Facility: CLINIC | Age: 43
End: 2023-11-29

## 2023-11-29 DIAGNOSIS — F32.A DEPRESSION, UNSPECIFIED DEPRESSION TYPE: Primary | ICD-10-CM

## 2023-11-29 NOTE — TELEPHONE ENCOUNTER
Dr. Shobha Brewer, please advise if medication can be ordered for her migraine. Per the last OV notes:  Migraines with varying frequency  Discussed preventive therapy but patient not interested  Continue Advil migraine prn  May consider Medrol  dose pack for status migrainosus      Order has been placed for psychiatry. Please review and sign if agreeable. Reviewed and electronically signed by:  [address], [facility]

## 2023-11-29 NOTE — TELEPHONE ENCOUNTER
From: Purvi Johns  To: Marcus Muqtadanay  Sent: 11/29/2023 2:29 PM CST  Subject: cluster migraine sinus pressure    Hello, I am having the cluster migraines with sinus pressure. Feeling nauseous with it. Would you be able to send in a script please. There are notes in my chart. Also, I am wondering if you have a referral for a psych that helps with depression that understands the fear of other neuro meds. I am having mood swings with my cycle. My therapist recommending asking you since I am hesitant about taking other medications because past experiences. Thank you.

## 2023-12-01 RX ORDER — METHYLPREDNISOLONE 4 MG/1
TABLET ORAL
Qty: 1 EACH | Refills: 0 | Status: SHIPPED | OUTPATIENT
Start: 2023-12-01

## 2023-12-01 NOTE — TELEPHONE ENCOUNTER
Spoke with patient directly. Cluster migraine on and off x 4 days    Medrol dose pack ordered. No URI or sinus infection at the moment.     Advise to see Psych for discussion for need for medication for mood disorder

## 2023-12-12 LAB
ABSOLUTE BASOPHILS: 29 CELLS/UL (ref 0–200)
ABSOLUTE EOSINOPHILS: 108 CELLS/UL (ref 15–500)
ABSOLUTE LYMPHOCYTES: 1590 CELLS/UL (ref 850–3900)
ABSOLUTE MONOCYTES: 422 CELLS/UL (ref 200–950)
ABSOLUTE NEUTROPHILS: 3551 CELLS/UL (ref 1500–7800)
ALBUMIN/GLOBULIN RATIO: 1.7 (CALC) (ref 1–2.5)
ALBUMIN: 4 G/DL (ref 3.6–5.1)
ALKALINE PHOSPHATASE: 79 U/L (ref 31–125)
ALT: 12 U/L (ref 6–29)
AST: 14 U/L (ref 10–35)
BASOPHILS: 0.5 %
BILIRUBIN, TOTAL: 0.4 MG/DL (ref 0.2–1.2)
BUN: 15 MG/DL (ref 7–25)
CALCIUM: 8.7 MG/DL (ref 8.6–10.2)
CARBON DIOXIDE: 28 MMOL/L (ref 20–32)
CHLORIDE: 106 MMOL/L (ref 98–110)
CHOL/HDLC RATIO: 2.5 (CALC)
CHOLESTEROL, TOTAL: 158 MG/DL
CREATININE: 0.7 MG/DL (ref 0.5–0.99)
EGFR: 109 ML/MIN/1.73M2
EOSINOPHILS: 1.9 %
GLOBULIN: 2.3 G/DL (CALC) (ref 1.9–3.7)
GLUCOSE: 74 MG/DL (ref 65–99)
HDL CHOLESTEROL: 64 MG/DL
HEMATOCRIT: 41.6 % (ref 35–45)
HEMOGLOBIN: 13.7 G/DL (ref 11.7–15.5)
LDL-CHOLESTEROL: 79 MG/DL (CALC)
LYMPHOCYTES: 27.9 %
MCH: 29.8 PG (ref 27–33)
MCHC: 32.9 G/DL (ref 32–36)
MCV: 90.6 FL (ref 80–100)
MONOCYTES: 7.4 %
MPV: 11.8 FL (ref 7.5–12.5)
NEUTROPHILS: 62.3 %
NON-HDL CHOLESTEROL: 94 MG/DL (CALC)
PLATELET COUNT: 199 THOUSAND/UL (ref 140–400)
POTASSIUM: 3.9 MMOL/L (ref 3.5–5.3)
PROTEIN, TOTAL: 6.3 G/DL (ref 6.1–8.1)
RDW: 11.9 % (ref 11–15)
RED BLOOD CELL COUNT: 4.59 MILLION/UL (ref 3.8–5.1)
SODIUM: 140 MMOL/L (ref 135–146)
TRIGLYCERIDES: 72 MG/DL
TSH W/REFLEX TO FT4: 2.36 MIU/L
WHITE BLOOD CELL COUNT: 5.7 THOUSAND/UL (ref 3.8–10.8)

## 2023-12-18 ENCOUNTER — OFFICE VISIT (OUTPATIENT)
Dept: INTERNAL MEDICINE CLINIC | Facility: CLINIC | Age: 43
End: 2023-12-18
Payer: COMMERCIAL

## 2023-12-18 VITALS
HEART RATE: 74 BPM | WEIGHT: 220.63 LBS | TEMPERATURE: 99 F | BODY MASS INDEX: 32.68 KG/M2 | OXYGEN SATURATION: 99 % | SYSTOLIC BLOOD PRESSURE: 120 MMHG | RESPIRATION RATE: 16 BRPM | HEIGHT: 69 IN | DIASTOLIC BLOOD PRESSURE: 78 MMHG

## 2023-12-18 DIAGNOSIS — F41.9 ANXIETY: ICD-10-CM

## 2023-12-18 DIAGNOSIS — Z82.62 FAMILY HISTORY OF OSTEOPOROSIS: ICD-10-CM

## 2023-12-18 DIAGNOSIS — H60.543 DERMATITIS OF BOTH EAR CANALS: ICD-10-CM

## 2023-12-18 DIAGNOSIS — E61.1 IRON DEFICIENCY: ICD-10-CM

## 2023-12-18 DIAGNOSIS — G40.209 PARTIAL EPILEPSY WITH IMPAIRMENT OF CONSCIOUSNESS (HCC): ICD-10-CM

## 2023-12-18 DIAGNOSIS — G56.02 LEFT CARPAL TUNNEL SYNDROME: ICD-10-CM

## 2023-12-18 DIAGNOSIS — J32.9 FREQUENT SINUS INFECTIONS: ICD-10-CM

## 2023-12-18 DIAGNOSIS — G43.809 OTHER MIGRAINE WITHOUT STATUS MIGRAINOSUS, NOT INTRACTABLE: ICD-10-CM

## 2023-12-18 DIAGNOSIS — Z12.11 SCREEN FOR COLON CANCER: ICD-10-CM

## 2023-12-18 DIAGNOSIS — Z00.00 ROUTINE GENERAL MEDICAL EXAMINATION AT A HEALTH CARE FACILITY: Primary | ICD-10-CM

## 2023-12-18 DIAGNOSIS — M24.80 GENERALIZED ARTICULAR HYPERMOBILITY: ICD-10-CM

## 2024-01-10 ENCOUNTER — TELEPHONE (OUTPATIENT)
Dept: HEMATOLOGY/ONCOLOGY | Facility: HOSPITAL | Age: 44
End: 2024-01-10

## 2024-01-10 ENCOUNTER — PATIENT MESSAGE (OUTPATIENT)
Dept: INTERNAL MEDICINE CLINIC | Facility: CLINIC | Age: 44
End: 2024-01-10

## 2024-01-10 NOTE — TELEPHONE ENCOUNTER
From: Kaycee Javier  Sent: 1/10/2024 9:24 AM CST  To: [facility] Clinical Staff  Subject: Stool sample test    Nevermind. Of course after looking and looking and then messaging, I find it. I am sorry to bother you. Thank you.

## 2024-01-22 ENCOUNTER — TELEPHONE (OUTPATIENT)
Dept: HEMATOLOGY/ONCOLOGY | Facility: HOSPITAL | Age: 44
End: 2024-01-22

## 2024-01-22 NOTE — TELEPHONE ENCOUNTER
Patient called for a consult with Dr Lovell.  Referred by Dr Castañeda.  DX-Iron deficiency. Please call back.  Thank you.

## 2024-02-08 ENCOUNTER — OFFICE VISIT (OUTPATIENT)
Dept: HEMATOLOGY/ONCOLOGY | Facility: HOSPITAL | Age: 44
End: 2024-02-08
Attending: INTERNAL MEDICINE
Payer: COMMERCIAL

## 2024-02-08 VITALS
SYSTOLIC BLOOD PRESSURE: 142 MMHG | HEIGHT: 69 IN | BODY MASS INDEX: 33.38 KG/M2 | OXYGEN SATURATION: 100 % | RESPIRATION RATE: 18 BRPM | WEIGHT: 225.38 LBS | HEART RATE: 84 BPM | DIASTOLIC BLOOD PRESSURE: 88 MMHG | TEMPERATURE: 97 F

## 2024-02-08 DIAGNOSIS — G40.209 PARTIAL EPILEPSY WITH IMPAIRMENT OF CONSCIOUSNESS (HCC): ICD-10-CM

## 2024-02-08 DIAGNOSIS — E61.1 IRON DEFICIENCY: Primary | ICD-10-CM

## 2024-02-08 PROCEDURE — 99205 OFFICE O/P NEW HI 60 MIN: CPT | Performed by: INTERNAL MEDICINE

## 2024-02-08 NOTE — PROGRESS NOTES
Hematology/Oncology Initial Consultation Note    Patient Name: Kaycee Javier  Medical Record Number: [id number]    YOB: 1980   Date of Consultation: 2024   PCP: Glenda Bliss DO    Reason for Consultation:  Kaycee Javier was seen today for the diagnosis of iron deficiency     History of Present Illness:      42 y/o F PMH seizure disorder presenting for evaluation of iron deficiency.    - reports was first diagnosed with seizures at age 10 but may have started at age 7  - seizures have been very well controlled, none since , on keppra  - she has been on iron pills before but was never told she had iron deficiency until recently  - develops bad constipation with oral iron supplementation  - having fatigue, brittle nails, low energy  - has heavy periods in the first few days  - needs to get new cologuard box from PCP, said she lost the previous one  - denies any bleeding in urine/stool or abnormal vaginal bleeding    Past Medical History:  Past Medical History:   Diagnosis Date    ASTHMA     Epilepsy (HCC)     HEADACHES     migraines       Past Surgical History:   Procedure Laterality Date      x 2    REPAIR OF HAMMERTOE,ONE  2012    Procedure: TOTAL METATARSAL PHALANGEAL JOINT REPLACEMENT;  Surgeon: Que Bailey DPM;  Location: [facility] [place], [facility]    WISDOM TEETH REMOVED         Home Medications:   levetiracetam (KEPPRA) 1000 MG Oral Tab Take 1 tablet (1,000 mg total) by mouth 2 (two) times daily. 180 tablet 3    cholecalciferol 50 MCG (2000 UT) Oral Cap Take 1 capsule (2,000 Units total) by mouth in the morning and 1 capsule (2,000 Units total) before bedtime.      predniSONE 20 MG Oral Tab TAKE 2 TABLETS BY MOUTH EVERY DAY FOR 3 DAYS THEN AS DIRECTED      Multiple Vitamin (MULTI-VITAMIN DAILY) Oral Tab Take by mouth.      Betamethasone Dipropionate Aug 0.05 % External Cream Apply to affected skin bid prn 60 g 1    Fluticasone Propionate 50 MCG/ACT  Nasal Suspension 2 sprays by Each Nare route daily. 3 Bottle 3    Albuterol Sulfate  (90 Base) MCG/ACT Inhalation Aero Soln INHA E 1 TO 2 PUFFS EVERY 4 TO 6 HOURS AS NEEDED 1 Inhaler 2    Ascorbic Acid (VITAMIN C) 100 MG Oral Tab Take 1 tablet (100 mg total) by mouth daily.       -------  Current Outpatient Medications on File Prior to Visit   Medication Sig Dispense Refill    levetiracetam (KEPPRA) 1000 MG Oral Tab Take 1 tablet (1,000 mg total) by mouth 2 (two) times daily. 180 tablet 3    cholecalciferol 50 MCG (2000 UT) Oral Cap Take 1 capsule (2,000 Units total) by mouth in the morning and 1 capsule (2,000 Units total) before bedtime.      predniSONE 20 MG Oral Tab TAKE 2 TABLETS BY MOUTH EVERY DAY FOR 3 DAYS THEN AS DIRECTED      Multiple Vitamin (MULTI-VITAMIN DAILY) Oral Tab Take by mouth.      Betamethasone Dipropionate Aug 0.05 % External Cream Apply to affected skin bid prn 60 g 1    Fluticasone Propionate 50 MCG/ACT Nasal Suspension 2 sprays by Each Nare route daily. 3 Bottle 3    Albuterol Sulfate  (90 Base) MCG/ACT Inhalation Aero Soln INHA E 1 TO 2 PUFFS EVERY 4 TO 6 HOURS AS NEEDED 1 Inhaler 2    Ascorbic Acid (VITAMIN C) 100 MG Oral Tab Take 1 tablet (100 mg total) by mouth daily.      EPINEPHrine 0.3 MG/0.3ML Injection Solution Auto-injector  (Patient not taking: Reported on 2/8/2024)       No current facility-administered medications on file prior to visit.       Allergies:   Allergies   Allergen Reactions    Bees SWELLING    Other SWELLING     Bug bites    Antihistamine [Dexbrompheniramine-Pseudoeph]      All antihistamines interfere with epilepsy meds    Dilantin [Phenytoin Sodium]      Interferes with epilepsy meds       Psychosocial History:  Social History     Social History Narrative    Not on file     Social History     Socioeconomic History    Marital status:    Tobacco Use    Smoking status: Never    Smokeless tobacco: Never   Vaping Use    Vaping Use: Never used    Substance and Sexual Activity    Alcohol use: Yes     Comment: social    Drug use: No    Sexual activity: Yes     Partners: Male     Birth control/protection: Vasectomy       Family Medical History:  Family History   Problem Relation Age of Onset    Hypertension Father     Lipids Father     Obesity Father     Other (Other) Father         emphysema     Hypertension Mother     Heart Disorder Mother         heart murmur     Other (osteoporosis) Mother     Cancer Paternal Grandfather         lung cancer     Other (lung cancer) Paternal Grandfather     Other (osteoporosis) Maternal Grandmother     Other (Other) Maternal Grandfather         alzheimers        Review of Systems:  A 10-point ROS was done with pertinent positives and negative per the HPI    Vital Signs:  Height: 175.3 cm (5' 9\") (02/08 1245)  Weight: 102.2 kg (225 lb 6.4 oz) (02/08 1245)  BSA (Calculated - sq m): 2.17 sq meters (02/08 1245)  Pulse: 84 (02/08 1245)  BP: 142/88 (02/08 1245)  Temp: 97.2 °F (36.2 °C) (02/08 1245)  Do Not Use - Resp Rate: --  SpO2: 100 % (02/08 1245)    Wt Readings from Last 6 Encounters:   02/08/24 102.2 kg (225 lb 6.4 oz)   12/18/23 100.1 kg (220 lb 9.6 oz)   08/29/23 96.2 kg (212 lb)   08/01/23 93.4 kg (206 lb)   05/02/23 91.6 kg (202 lb)   03/20/23 91.6 kg (202 lb)       Physical Examination:  General: Patient is alert and oriented, not in acute distress  Psych: Mood and affect are appropriate  Eyes: EOMI, PERRL  ENT: Oropharynx is clear, no adenopathy  CV: no LE edema  Respiratory: Non-labored respirations  GI/Abd: Soft, non-tender   Neurological: Grossly intact   Skin: no rashes or petechiae    Laboratory:  Lab Results   Component Value Date    WBC 5.7 12/11/2023    WBC 4.4 02/06/2023    WBC 4.31 12/16/2020    HGB 13.7 12/11/2023    HGB 13.4 02/06/2023    HGB 12.8 12/16/2020    HCT 41.6 12/11/2023    MCV 90.6 12/11/2023    MCH 29.8 12/11/2023    MCHC 32.9 12/11/2023    RDW 11.9 12/11/2023     12/11/2023      02/06/2023     12/16/2020     Lab Results   Component Value Date    GLU 74 12/11/2023    BUN 15 12/11/2023    BUNCREA SEE NOTE: 12/11/2023    CREATSERUM 0.70 12/11/2023    CREATSERUM 0.77 02/06/2023    CREATSERUM 0.67 12/16/2020    CA 8.7 12/11/2023    ALKPHO 79 12/11/2023    AST 14 12/11/2023    ALT 12 12/11/2023    BILT 0.4 12/11/2023    TP 6.3 12/11/2023    ALB 4.0 12/11/2023    GLOBULIN 2.3 12/11/2023    AGRATIO 1.7 12/11/2023     12/11/2023    K 3.9 12/11/2023     12/11/2023    CO2 28 12/11/2023     Lab Results   Component Value Date    PTT 25.2 07/26/2019    INR 1.0 07/26/2019       Impression & Plan:     Iron deficiency  - iron studies consistent with iron deficiency  - unable to tolerate oral iron supplementation; schedule 1000mg IV iron dextran, close monitoring in infusion  - we discussed that iron deficiency anemia is associated with increased incidence of seizures in people with epilepsy, so all the more reason to keep her iron levels up  - repeat cbc/iron/tibc/ferritin in 3 months to reassess iron stores  - proceed with cologuard screening given age 43    Epilepsy  - well controlled on Keppra    F/u as needed    Jordan Lovell MD  Hematology/Medical Oncology  [facility]

## 2024-02-08 NOTE — PROGRESS NOTES
Education Record    Learner:  Patient    Disease / Diagnosis: Iron deficiency    Barriers / Limitations:  None   Comments:    Method:  Discussion   Comments:    General Topics:  Medication, Side effects and symptom management, and Plan of care reviewed   Comments:    Outcome:  Shows understanding   Comments:    Here for new consult- iron deficiency. She feels low energy/fatigue, difficulty catching her breath at times, cold hands/feet, and brittle nails. She has heavy menses on day 1 and 2 with clots. No other abnormal bleeding noted. She did not tolerate oral iron d/t constipation/stomach upset.

## 2024-02-15 ENCOUNTER — OFFICE VISIT (OUTPATIENT)
Dept: HEMATOLOGY/ONCOLOGY | Age: 44
End: 2024-02-15
Attending: INTERNAL MEDICINE
Payer: COMMERCIAL

## 2024-02-15 VITALS
SYSTOLIC BLOOD PRESSURE: 129 MMHG | RESPIRATION RATE: 16 BRPM | DIASTOLIC BLOOD PRESSURE: 78 MMHG | TEMPERATURE: 99 F | OXYGEN SATURATION: 100 % | HEART RATE: 68 BPM

## 2024-02-15 DIAGNOSIS — E61.1 IRON DEFICIENCY: Primary | ICD-10-CM

## 2024-02-15 PROCEDURE — 96365 THER/PROPH/DIAG IV INF INIT: CPT

## 2024-02-15 PROCEDURE — 96376 TX/PRO/DX INJ SAME DRUG ADON: CPT

## 2024-02-15 NOTE — PROGRESS NOTES
Education Record    Learner:  Patient    Disease / Diagnosis:pt here for infed    Barriers / Limitations:  None    Method:  Brief focused, printed material and  reinforcement    General Topics:  Plan of care reviewed    Outcome:pt tolerated infusion with no c/o.

## 2024-02-16 ENCOUNTER — TELEPHONE (OUTPATIENT)
Dept: HEMATOLOGY/ONCOLOGY | Facility: HOSPITAL | Age: 44
End: 2024-02-16

## 2024-02-16 NOTE — TELEPHONE ENCOUNTER
Patient had infusion of iron yesterday and at 130 am woke up with some back and chest pain, abdominal cramping, Migraine.  Has taken a hot shower, placed Icy Hot to sore areas and taking Tylenol for the H/A.  Is feeling better at this time.  Will call or present to ER if symptoms worsen but she thinks she is getting better and has been know to have delayed reactions to tx of other kinds in the past.

## 2024-02-16 NOTE — TELEPHONE ENCOUNTER
Kaycee [phone] had her infusion on Thursday and she woke up this am  she was having back and chest pain with stomach camping. I now have a migraine and I feel nausea.  Would like to know what to do .Thanks Aide

## 2024-03-22 ENCOUNTER — OFFICE VISIT (OUTPATIENT)
Dept: INTEGRATIVE MEDICINE | Facility: CLINIC | Age: 44
End: 2024-03-22
Payer: COMMERCIAL

## 2024-03-22 VITALS
DIASTOLIC BLOOD PRESSURE: 80 MMHG | HEIGHT: 69 IN | WEIGHT: 229.63 LBS | OXYGEN SATURATION: 99 % | HEART RATE: 73 BPM | BODY MASS INDEX: 34.01 KG/M2 | SYSTOLIC BLOOD PRESSURE: 112 MMHG

## 2024-03-22 DIAGNOSIS — F43.9 STRESS: ICD-10-CM

## 2024-03-22 DIAGNOSIS — G89.29 OTHER CHRONIC PAIN: ICD-10-CM

## 2024-03-22 DIAGNOSIS — N92.4 EXCESSIVE BLEEDING IN PREMENOPAUSAL PERIOD: ICD-10-CM

## 2024-03-22 DIAGNOSIS — R45.86 MOOD CHANGES: ICD-10-CM

## 2024-03-22 DIAGNOSIS — R19.8 ABNORMAL BOWEL MOVEMENT: ICD-10-CM

## 2024-03-22 DIAGNOSIS — E61.1 IRON DEFICIENCY: ICD-10-CM

## 2024-03-22 DIAGNOSIS — R63.5 WEIGHT GAIN: Primary | ICD-10-CM
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
## 2024-04-02 LAB
% SATURATION: 42 % (CALC) (ref 16–45)
ABSOLUTE BASOPHILS: 18 CELLS/UL (ref 0–200)
ABSOLUTE EOSINOPHILS: 90 CELLS/UL (ref 15–500)
ABSOLUTE LYMPHOCYTES: 1301 CELLS/UL (ref 850–3900)
ABSOLUTE MONOCYTES: 293 CELLS/UL (ref 200–950)
ABSOLUTE NEUTROPHILS: 2799 CELLS/UL (ref 1500–7800)
BASOPHILS: 0.4 %
CORTISOL, TOTAL: 25.3 MCG/DL
DHEA SULFATE: 63 MCG/DL (ref 15–205)
EOSINOPHILS: 2 %
ESTRADIOL: 34 PG/ML
FERRITIN: 94 NG/ML (ref 16–232)
FREE TESTOSTERONE: 2.2 PG/ML (ref 0.1–6.4)
FSH: 5.3 MIU/ML
HEMATOCRIT: 44.1 % (ref 35–45)
HEMOGLOBIN: 14.3 G/DL (ref 11.7–15.5)
INSULIN: 6.8 UIU/ML
IRON BINDING CAPACITY: 280 MCG/DL (CALC) (ref 250–450)
IRON, TOTAL: 118 MCG/DL (ref 40–190)
LH: 6.7 MIU/ML
LYMPHOCYTES: 28.9 %
MCH: 30.2 PG (ref 27–33)
MCHC: 32.4 G/DL (ref 32–36)
MCV: 93 FL (ref 80–100)
MONOCYTES: 6.5 %
MPV: 11.9 FL (ref 7.5–12.5)
NEUTROPHILS: 62.2 %
PLATELET COUNT: 198 THOUSAND/UL (ref 140–400)
PROGESTERONE: 3.4 NG/ML
RDW: 12.9 % (ref 11–15)
RED BLOOD CELL COUNT: 4.74 MILLION/UL (ref 3.8–5.1)
T3 REVERSE, /MS/MS: 17 NG/DL (ref 8–25)
T3, FREE: 3 PG/ML (ref 2.3–4.2)
T4, FREE: 1.1 NG/DL (ref 0.8–1.8)
TESTOSTERONE, TOTAL,$/MS/MS: 38 NG/DL (ref 2–45)
THYROGLOBULIN ANTIBODIES: <1 IU/ML
THYROID PEROXIDASE$ANTIBODIES: <1 IU/ML
TSH: 2.66 MIU/L
WHITE BLOOD CELL COUNT: 4.5 THOUSAND/UL (ref 3.8–10.8)

## 2024-04-30 ENCOUNTER — TELEMEDICINE (OUTPATIENT)
Dept: INTEGRATIVE MEDICINE | Facility: CLINIC | Age: 44
End: 2024-04-30
Payer: COMMERCIAL

## 2024-04-30 DIAGNOSIS — R14.0 BLOATING: ICD-10-CM

## 2024-04-30 DIAGNOSIS — F43.9 STRESS: ICD-10-CM

## 2024-04-30 DIAGNOSIS — R79.89 HIGH SERUM CORTISOL: Primary | ICD-10-CM

## 2024-04-30 DIAGNOSIS — R63.5 WEIGHT GAIN: ICD-10-CM

## 2024-04-30 DIAGNOSIS — N92.4 EXCESSIVE BLEEDING IN PREMENOPAUSAL PERIOD: ICD-10-CM

## 2024-04-30 DIAGNOSIS — G89.29 OTHER CHRONIC PAIN: ICD-10-CM

## 2024-04-30 PROCEDURE — 99215 OFFICE O/P EST HI 40 MIN: CPT | Performed by: PHYSICIAN ASSISTANT

## 2024-04-30 NOTE — PATIENT INSTRUCTIONS
Somatic yoga to help calm   [place] briones is another option     Upload previous lab work from functional medicine     Vitex by Pure Encapsulations - Help increase LH and support natural progesterone         Menstrual Cycle Support: Chaste tree has been associated with supporting breast comfort and positive mood during the menstrual cycle, as suggested by numerous randomized, double blind, placebo-controlled trials. It has also been associated with promoting healthy menstrual luteal phase length and menstrual regularity for some women, helping to maintain healthy reproductive system function.*    Directions: Take 1 tablet twice per day for 3 months        Suggested Use: Adults take 1 tablet before bedtime or as recommended by a healthcare professional. Increase to 2 tablets during times of high stress. For nightly use.         Serving Size:1- 2 Tablets    Amount Per Serving  Calories … 10  Total Carbohydrate … 1g  Sodium … 10mg  Stress-Reducing Proprietary Blend … 500mg  Ashwagandha (Withania somnifera) (Sensoril® brand) (root, leaf) Extract, L-theanine  Cortisol-Reducing Proprietary Blend … 450mg  Corral (Corral officinalis) (bark) Extract standardized to 2% honokiol and 1% magnolol, Epimedium (Epimedium spp.) (aerial parts) Extract  Phosphatidylserine … 100mg  (from soybean lecithin)        One capsule with every meal (bigger meals , if you take with a small meal it could cause burning)           The following website can be utilized to purchase supplements.     [email or web address]
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
## 2024-05-09 ENCOUNTER — APPOINTMENT (OUTPATIENT)
Dept: HEMATOLOGY/ONCOLOGY | Age: 44
End: 2024-05-09
Attending: INTERNAL MEDICINE
Payer: COMMERCIAL

## 2024-05-10 ENCOUNTER — NURSE ONLY (OUTPATIENT)
Dept: HEMATOLOGY/ONCOLOGY | Age: 44
End: 2024-05-10
Attending: INTERNAL MEDICINE
Payer: COMMERCIAL

## 2024-05-10 DIAGNOSIS — E61.1 IRON DEFICIENCY: ICD-10-CM

## 2024-05-10 LAB
BASOPHILS # BLD AUTO: 0.02 X10(3) UL (ref 0–0.2)
BASOPHILS NFR BLD AUTO: 0.5 %
DEPRECATED HBV CORE AB SER IA-ACNC: 55.6 NG/ML
EOSINOPHIL # BLD AUTO: 0.07 X10(3) UL (ref 0–0.7)
EOSINOPHIL NFR BLD AUTO: 1.6 %
ERYTHROCYTE [DISTWIDTH] IN BLOOD BY AUTOMATED COUNT: 12.5 %
HCT VFR BLD AUTO: 40.9 %
HGB BLD-MCNC: 13.6 G/DL
IMM GRANULOCYTES # BLD AUTO: 0.01 X10(3) UL (ref 0–1)
IMM GRANULOCYTES NFR BLD: 0.2 %
IRON SATN MFR SERPL: 32 %
IRON SERPL-MCNC: 89 UG/DL
LYMPHOCYTES # BLD AUTO: 1.06 X10(3) UL (ref 1–4)
LYMPHOCYTES NFR BLD AUTO: 24.1 %
MCH RBC QN AUTO: 30.4 PG (ref 26–34)
MCHC RBC AUTO-ENTMCNC: 33.3 G/DL (ref 31–37)
MCV RBC AUTO: 91.3 FL
MONOCYTES # BLD AUTO: 0.31 X10(3) UL (ref 0.1–1)
MONOCYTES NFR BLD AUTO: 7.1 %
NEUTROPHILS # BLD AUTO: 2.92 X10 (3) UL (ref 1.5–7.7)
NEUTROPHILS # BLD AUTO: 2.92 X10(3) UL (ref 1.5–7.7)
NEUTROPHILS NFR BLD AUTO: 66.5 %
PLATELET # BLD AUTO: 195 10(3)UL (ref 150–450)
RBC # BLD AUTO: 4.48 X10(6)UL
TIBC SERPL-MCNC: 274 UG/DL (ref 240–450)
TRANSFERRIN SERPL-MCNC: 184 MG/DL (ref 200–360)
WBC # BLD AUTO: 4.4 X10(3) UL (ref 4–11)

## 2024-05-10 PROCEDURE — 82728 ASSAY OF FERRITIN: CPT

## 2024-05-10 PROCEDURE — 83540 ASSAY OF IRON: CPT

## 2024-05-10 PROCEDURE — 36415 COLL VENOUS BLD VENIPUNCTURE: CPT

## 2024-05-10 PROCEDURE — 85025 COMPLETE CBC W/AUTO DIFF WBC: CPT

## 2024-05-10 PROCEDURE — 83550 IRON BINDING TEST: CPT

## 2024-06-11 ENCOUNTER — PATIENT MESSAGE (OUTPATIENT)
Dept: INTERNAL MEDICINE CLINIC | Facility: CLINIC | Age: 44
End: 2024-06-11

## 2024-06-11 DIAGNOSIS — Z12.31 ENCOUNTER FOR SCREENING MAMMOGRAM FOR MALIGNANT NEOPLASM OF BREAST: Primary | ICD-10-CM

## 2024-06-11 NOTE — TELEPHONE ENCOUNTER
From: Kaycee Javier  To: Aniya Castañeda  Sent: 6/11/2024 8:45 AM CDT  Subject: Derm and mammogtam    Hi! I am due to go to the dermatologist and get a mammogram. Before they were with the old practice. Are you able to do women’s annual visits and I get mammogram thru [facility] now. And do you have a Dermatologist recommendation? Or should I stay with who I was going to and will you get that info still? Let me know when you have time please. Thank you! Kaycee

## 2024-06-11 NOTE — TELEPHONE ENCOUNTER
C. Garry- ok pt to schedule paps with you?  Ok to order mammogram (last 4/23/2021) or address at annual visit in 12/2024?    Please advise. Thank you.

## 2024-06-12 NOTE — TELEPHONE ENCOUNTER
Last mammogram was at [facility] 6/26/23. Order placed for updated mammogram but needs to wait to complete until after 6/26/24.     Yes, I can do her pap.

## 2024-06-20 ENCOUNTER — TELEMEDICINE (OUTPATIENT)
Dept: INTEGRATIVE MEDICINE | Facility: CLINIC | Age: 44
End: 2024-06-20

## 2024-06-20 VITALS — HEIGHT: 69 IN | WEIGHT: 227 LBS | BODY MASS INDEX: 33.62 KG/M2

## 2024-06-20 DIAGNOSIS — G47.9 SLEEP DISTURBANCE: ICD-10-CM

## 2024-06-20 DIAGNOSIS — N92.4 EXCESSIVE BLEEDING IN PREMENOPAUSAL PERIOD: ICD-10-CM

## 2024-06-20 DIAGNOSIS — R14.0 BLOATING: ICD-10-CM

## 2024-06-20 DIAGNOSIS — R45.86 MOOD CHANGES: ICD-10-CM

## 2024-06-20 DIAGNOSIS — G89.29 OTHER CHRONIC PAIN: ICD-10-CM

## 2024-06-20 DIAGNOSIS — K59.00 CONSTIPATION, UNSPECIFIED CONSTIPATION TYPE: ICD-10-CM

## 2024-06-20 DIAGNOSIS — E66.3 OVERWEIGHT WITH BODY MASS INDEX (BMI) 25.0-29.9: Primary | ICD-10-CM

## 2024-06-20 PROCEDURE — 99215 OFFICE O/P EST HI 40 MIN: CPT | Performed by: PHYSICIAN ASSISTANT
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
## 2024-06-20 NOTE — PATIENT INSTRUCTIONS
Stomach acid test    First thing in the morning.  1/4 tsp baking soda + 4 ounces of water.  You should burp within 3 minutes if you do not burp it means low stomach acid.     If you do not burp - really see improvement with digestive enzyme     Discussed getting the box and ease into dietary changes before the program     Recommend 14 day detox and elimination diet   If things are feeling - less inflamed, less constipation/bowel issues, sleep improve  Reintroduce 1 thing introduce it foe 2-3 days and monitor for symptoms - re eliminate it and then at the end we would retry it   Gluten  Soy  Dairy  [place]   DO not stop   Chastetree  Cortisol manager    Test restarting probiotic later - once through reintroduction     Digestive enzyme with larger meal     Back pain - we will monitor benefit from acupuncture  Consider PT or fascial release if no improvement       In the future for gut health _ DO NOT PURCHASE YET     Serving Size: 2 Capsules    Amount Per Serving  Deglycyrrhized Licorice Root Extract ... 150mg  (Glycyrrhiza glabra)(Root)(GutGard)  (Standardized to contain 10% Flavonoids and 3.5% Glabridin)  Marshmallow Root ... 150mg  Slippery Elm Bark ... 150mg  Aloe vera Leaf Gel Extract ... 100mg

## 2024-07-01 ENCOUNTER — HOSPITAL ENCOUNTER (OUTPATIENT)
Dept: MAMMOGRAPHY | Age: 44
Discharge: HOME | End: 2024-07-01
Attending: PHYSICIAN ASSISTANT
Payer: COMMERCIAL

## 2024-07-01 DIAGNOSIS — Z12.31 ENCOUNTER FOR SCREENING MAMMOGRAM FOR MALIGNANT NEOPLASM OF BREAST: ICD-10-CM

## 2024-07-01 PROCEDURE — 77067 SCR MAMMO BI INCL CAD: CPT | Performed by: PHYSICIAN ASSISTANT

## 2024-07-01 PROCEDURE — 77063 BREAST TOMOSYNTHESIS BI: CPT | Performed by: PHYSICIAN ASSISTANT

## 2024-07-09 ENCOUNTER — HOSPITAL ENCOUNTER (OUTPATIENT)
Dept: MAMMOGRAPHY | Facility: HOSPITAL | Age: 44
Discharge: HOME | End: 2024-07-09
Attending: PHYSICIAN ASSISTANT
Payer: COMMERCIAL

## 2024-07-09 DIAGNOSIS — R92.2 INCONCLUSIVE MAMMOGRAM: ICD-10-CM

## 2024-07-09 PROCEDURE — 77065 DX MAMMO INCL CAD UNI: CPT | Performed by: PHYSICIAN ASSISTANT

## 2024-07-09 PROCEDURE — 77061 BREAST TOMOSYNTHESIS UNI: CPT | Performed by: PHYSICIAN ASSISTANT

## 2024-07-09 PROCEDURE — 76642 ULTRASOUND BREAST LIMITED: CPT | Performed by: PHYSICIAN ASSISTANT

## 2024-07-23 ENCOUNTER — TELEMEDICINE (OUTPATIENT)
Dept: INTEGRATIVE MEDICINE | Facility: CLINIC | Age: 44
End: 2024-07-23
Payer: COMMERCIAL

## 2024-07-23 DIAGNOSIS — F43.9 STRESS: ICD-10-CM

## 2024-07-23 DIAGNOSIS — G89.29 OTHER CHRONIC PAIN: ICD-10-CM

## 2024-07-23 DIAGNOSIS — R14.0 BLOATING: Primary | ICD-10-CM

## 2024-07-23 DIAGNOSIS — N92.4 EXCESSIVE BLEEDING IN PREMENOPAUSAL PERIOD: ICD-10-CM

## 2024-07-23 DIAGNOSIS — L29.9 ITCHING: ICD-10-CM

## 2024-07-23 DIAGNOSIS — R63.5 WEIGHT GAIN: ICD-10-CM

## 2024-07-23 PROCEDURE — 99214 OFFICE O/P EST MOD 30 MIN: CPT | Performed by: PHYSICIAN ASSISTANT

## 2024-07-23 NOTE — PATIENT INSTRUCTIONS
Th2 Modulator  Pure Encapsulations    Part of the PureResponse™ Immune Protocol‡ for healthy immune balance and function‡  Contains a blend of N-acetyl-l-cysteine (NAC), quercetin, perilla seed and astragalus extracts  Offers support for self-tissue response‡  Promotes sinus, respiratory and gastrointestinal function‡  Helps to modulate the Th2 immune response and maintain Th1/Th2 balance at the cellular level‡  The PureResponse™ formula Th2 Modulator is designed to support healthy Th1/Th2 balance and mucosal health. Modulating Th2 status helps to maintain Th1/Th2 balance and offers support for self-tissue response. Th2 response is affected by stress, aging, and cytokine changes resulting from metabolic, hormonal, physiological, and environmental factors. The product helps support healthy modulation of Th2 cytokines, and eosinophil and mast cell activity. It also helps maintain healthy modulation of mucosal immune responses to environmental factors. Th2 modulation may be indicated when addressing immune balance in the sinus, respiratory, urinary, and intestinal tracts. Perilla maintains healthy upper respiratory cytokine levels in animal models of Th2 dominance. It also supports cytokine homeostasis of GI mucosal tissue. In a randomized controlled trial involving 50 individuals, 150 mg of Perilla frutescens extract twice daily offered statistically significant support for GI comfort. N-acetyl-l-cysteine (NAC) offers mucolytic properties and promotes tissue levels of glutathione - a key component of the antioxidant defense system - to support the body's natural defense system. Lymphocytes rely on glutathione to function properly. It is believed to be through this antioxidant mechanism that NAC supports healthy Th1/Th2 balance and cytokine production to maintain tissue health. A multicenter, randomized, double-blind trial with 262 participants indicated that NAC supplementation for six months supported upper  respiratory tract and immune system health. Quercetin is known for its antioxidant activity and immune support properties, including support for Th1/Th2 balance and cytokine balance. Astragalus may help modulate the relationship between Th1 and Th2 cytokines.‡  2 capsules, 1-2 times daily, between meals, or as directed by a healthcare professional.        Serving Size: 2 Capsules    Amount Per Serving  N-acetyl-l-cysteine ... 300mg  Quercetin ... 250mg  Perilla extract ... 150mg  (Perilla frutescens)(seed)(standardized to contain 3% polyphenols)  Astragalus extract ... 160mg  (Astragalus membranaceus)(root)      Th1 Support  Pure Encapsulations    Part of the PureResponse™ Immune Protocol‡ for healthy immune balance and function‡  Contains a blend of berberine, baicalin from Chinese [employer], sulforaphane from broccoli, davie, and zinc  Maintains healthy immune biomarkers and cytokine balance related to cell tissue health and comfort‡  Promotes healthy Th1-predominant cellular immune response‡  The PureResponse™ formula Th1 Support is designed to support healthy activation of Th1 cells to promote innate immunity and cell-mediated immune defenses. Th1 responses are affected by stress, aging, and cytokine changes resulting from metabolic, hormonal, physiological, and environmental factors. The product helps balance cytokine production in order to promote Th1 differentiation. Support for Th1 may be indicated for enhancement of natural defenses. Clinical and preclinical studies show that berberine positively influences immune biomarkers related to cell and tissue health, and in vitro evidence suggests that berberine supports maturation of T cells into Th1 cells and production of IL-12, an important Th1-promoting cytokine. Sulforaphane from broccoli promotes various aspects of immunological homeostasis that maintain the health of mucosal cells. Cellular and animal models also suggest support for Th1-promoting interferon  gamma and interleukin-2 production. Baicalin from Skullcap offers support for healthy production of interferon gamma, promoting Th1-type immune responses, according to animal studies. Madiha modulates the production of IL-6 and TNF-alpha, according to preclinical data.‡  2 capsules, 1-2 times daily, with or between meals, or as directed by a health professional.          Serving Size: 2 Capsules    Amount Per Serving  Zinc ... 10mg  (as zinc picolinate)  Berberine HCl ... 500mg  Chinese skullcap extract ... 300mg  (Scutellaria baicalensis)(root)(standardized to contain 30% baicalin)  Broccoli sprout concentrate ... 100mg  (whole plant)(standardized to contain 400 mcg sulforaphane)  Madiha extract ... 120mg   (Zingiber officinale)(root)    [email or web address]    Phone  [phone]    Location  [address] [address]     Post core restore Vitamin D + K2 and calcium supplement  The following website can be utilized to purchase supplements.     [email or web address]
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
## 2024-10-15 ENCOUNTER — TELEPHONE (OUTPATIENT)
Dept: NEUROLOGY | Facility: CLINIC | Age: 44
End: 2024-10-15

## 2024-10-15 RX ORDER — METHYLPREDNISOLONE 4 MG/1
TABLET ORAL
Qty: 1 EACH | Refills: 0 | Status: SHIPPED | OUTPATIENT
Start: 2024-10-15

## 2024-10-15 NOTE — TELEPHONE ENCOUNTER
LOV 11/07/23  NOV 11/11/24 ([facility])    ASSESSMENT/PLAN:          ICD-10-CM     1. Partial idiopathic epilepsy with seizures of localized onset, not intractable, without status epilepticus (HCC)  G40.009         2. Partial epilepsy with impairment of consciousness (HCC)  G40.209 levetiracetam (KEPPRA) 1000 MG Oral Tab             Clinically in remission but patient would like to continue medications  Continue Keppra 1000 mg BID     Migraines with varying frequency  Discussed preventive therapy but patient not interested  Continue Advil migraine prn     May consider Medrol  dose pack for status migrainosus       Follow up in about 12 months

## 2024-10-15 NOTE — TELEPHONE ENCOUNTER
Patient is calling stating she has been having migraines since Saturday night. Patient stated she already had 4 and is asking for an steroid pack to be send to her pharmacy. Please advice

## 2024-11-11 ENCOUNTER — OFFICE VISIT (OUTPATIENT)
Dept: NEUROLOGY | Facility: CLINIC | Age: 44
End: 2024-11-11
Payer: COMMERCIAL

## 2024-11-11 VITALS
DIASTOLIC BLOOD PRESSURE: 80 MMHG | SYSTOLIC BLOOD PRESSURE: 110 MMHG | WEIGHT: 225 LBS | OXYGEN SATURATION: 99 % | HEART RATE: 59 BPM | BODY MASS INDEX: 33 KG/M2

## 2024-11-11 DIAGNOSIS — G43.909 EPISODIC MIGRAINE: ICD-10-CM

## 2024-11-11 DIAGNOSIS — G40.009 PARTIAL IDIOPATHIC EPILEPSY WITH SEIZURES OF LOCALIZED ONSET, NOT INTRACTABLE, WITHOUT STATUS EPILEPTICUS (HCC): Primary | ICD-10-CM

## 2024-11-11 PROCEDURE — 99214 OFFICE O/P EST MOD 30 MIN: CPT | Performed by: OTHER

## 2024-11-11 RX ORDER — LEVETIRACETAM 1000 MG/1
1000 TABLET ORAL 2 TIMES DAILY
Qty: 180 TABLET | Refills: 3 | Status: SHIPPED | OUTPATIENT
Start: 2024-11-11

## 2024-11-11 NOTE — PATIENT INSTRUCTIONS
Refill policies:    Allow 2-3 business days for refills; controlled substances may take longer.  Contact your pharmacy at least 5 days prior to running out of medication and have them send an electronic request or submit request through the “request refill” option in your [employer] account.  Refills are not addressed on weekends; covering physicians do not authorize routine medications on weekends.  No narcotics or controlled substances are refilled after noon on Fridays or by on call physicians.  By law, narcotics must be electronically prescribed.  A 30 day supply with no refills is the maximum allowed.  If your prescription is due for a refill, you may be due for a follow up appointment.  To best provide you care, patients receiving routine medications need to be seen at least once a year.  Patients receiving narcotic/controlled substance medications need to be seen at least once every 3 months.  In the event that your preferred pharmacy does not have the requested medication in stock (e.g. Backordered), it is your responsibility to find another pharmacy that has the requested medication available.  We will gladly send a new prescription to that pharmacy at your request.    Scheduling Tests:    If your physician has ordered radiology tests such as MRI or CT scans, please contact Central Scheduling at [phone] right away to schedule the test.  Once scheduled, the [facility] Centralized Referral Team will work with your insurance carrier to obtain pre-certification or prior authorization.  Depending on your insurance carrier, approval may take 3-10 days.  It is highly recommended patients assure they have received an authorization before having a test performed.  If test is done without insurance authorization, patient may be responsible for the entire amount billed.      Precertification and Prior Authorizations:  If your physician has recommended that you have a procedure or additional testing performed the [facility]  Centralized Referral Team will contact your insurance carrier to obtain pre-certification or prior authorization.    You are strongly encouraged to contact your insurance carrier to verify that your procedure/test has been approved and is a COVERED benefit.  Although the [facility] Centralized Referral Team does its due diligence, the insurance carrier gives the disclaimer that \"Although the procedure is authorized, this does not guarantee payment.\"    Ultimately the patient is responsible for payment.   Thank you for your understanding in this matter.  Paperwork Completion:  If you require FMLA or disability paperwork for your recovery, please make sure to either drop it off or have it faxed to our office at [phone]. Be sure the form has your name and date of birth on it.  The form will be faxed to our Forms Department and they will complete it for you.  There is a 25$ fee for all forms that need to be filled out.  Please be aware there is a 10-14 day turnaround time.  You will need to sign a release of information (GUNNAR) form if your paperwork does not come with one.  You may call the Forms Department with any questions at [phone].  Their fax number is [phone].

## 2024-12-03 ENCOUNTER — PATIENT MESSAGE (OUTPATIENT)
Dept: INTERNAL MEDICINE CLINIC | Facility: CLINIC | Age: 44
End: 2024-12-03

## 2024-12-03 ENCOUNTER — TELEMEDICINE (OUTPATIENT)
Dept: INTEGRATIVE MEDICINE | Facility: CLINIC | Age: 44
End: 2024-12-03
Payer: COMMERCIAL

## 2024-12-03 DIAGNOSIS — N92.4 EXCESSIVE BLEEDING IN PREMENOPAUSAL PERIOD: ICD-10-CM

## 2024-12-03 DIAGNOSIS — F43.9 STRESS: ICD-10-CM

## 2024-12-03 DIAGNOSIS — R14.0 BLOATING: Primary | ICD-10-CM

## 2024-12-03 DIAGNOSIS — R79.89 HIGH SERUM CORTISOL: ICD-10-CM

## 2024-12-03 DIAGNOSIS — G89.29 OTHER CHRONIC PAIN: ICD-10-CM

## 2024-12-03 DIAGNOSIS — R63.5 WEIGHT GAIN: ICD-10-CM

## 2024-12-03 DIAGNOSIS — G47.9 SLEEP DISTURBANCE: ICD-10-CM

## 2024-12-03 PROCEDURE — 99215 OFFICE O/P EST HI 40 MIN: CPT | Performed by: PHYSICIAN ASSISTANT

## 2024-12-03 NOTE — PATIENT INSTRUCTIONS
Plan   1) [facility] on [facility] by [facility] [place]   2) Stomach acid test    First thing in the morning.  1/4 tsp baking soda + 4 ounces of water.  You should burp within 3 minutes if you do not burp it means low stomach acid.     - based on labs we might add apple cider vinegar or betain hcl    3) restart the chastetree -     4) cortisol manager in the morning   5) continue magnesium spray at night
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
## 2024-12-23 LAB
% SATURATION: 12 % (CALC) (ref 16–45)
ABSOLUTE BASOPHILS: 20 CELLS/UL (ref 0–200)
ABSOLUTE EOSINOPHILS: 122 CELLS/UL (ref 15–500)
ABSOLUTE LYMPHOCYTES: 1992 CELLS/UL (ref 850–3900)
ABSOLUTE MONOCYTES: 517 CELLS/UL (ref 200–950)
ABSOLUTE NEUTROPHILS: 4148 CELLS/UL (ref 1500–7800)
ALBUMIN/GLOBULIN RATIO: 2 (CALC) (ref 1–2.5)
ALBUMIN: 4.5 G/DL (ref 3.6–5.1)
ALKALINE PHOSPHATASE: 71 U/L (ref 31–125)
ALT: 14 U/L (ref 6–29)
AST: 15 U/L (ref 10–35)
BASOPHILS: 0.3 %
BILIRUBIN, TOTAL: 0.2 MG/DL (ref 0.2–1.2)
BUN: 22 MG/DL (ref 7–25)
CALCIUM: 9.6 MG/DL (ref 8.6–10.2)
CARBON DIOXIDE: 27 MMOL/L (ref 20–32)
CHLORIDE: 105 MMOL/L (ref 98–110)
CREATININE: 0.79 MG/DL (ref 0.5–0.99)
EGFR: 93 ML/MIN/1.73M2
EOSINOPHILS: 1.8 %
FERRITIN: 15 NG/ML (ref 16–232)
GLOBULIN: 2.2 G/DL (CALC) (ref 1.9–3.7)
GLUCOSE: 81 MG/DL (ref 65–99)
HEMATOCRIT: 41 % (ref 35–45)
HEMOGLOBIN: 13.6 G/DL (ref 11.7–15.5)
HUMAN TRANSFORMING GROWTH FACTOR BETA 1 (TGF-B1): 5703 PG/ML (ref 0–22062)
IRON BINDING CAPACITY: 340 MCG/DL (CALC) (ref 250–450)
IRON, TOTAL: 42 MCG/DL (ref 40–190)
LYMPHOCYTES: 29.3 %
MCH: 30.1 PG (ref 27–33)
MCHC: 33.2 G/DL (ref 32–36)
MCV: 90.7 FL (ref 80–100)
MONOCYTES: 7.6 %
MPV: 11.9 FL (ref 7.5–12.5)
NEUTROPHILS: 61 %
PLATELET COUNT: 230 THOUSAND/UL (ref 140–400)
POTASSIUM: 4.4 MMOL/L (ref 3.5–5.3)
PROTEIN, TOTAL: 6.7 G/DL (ref 6.1–8.1)
RDW: 11.8 % (ref 11–15)
RED BLOOD CELL COUNT: 4.52 MILLION/UL (ref 3.8–5.1)
SODIUM: 142 MMOL/L (ref 135–146)
WHITE BLOOD CELL COUNT: 6.8 THOUSAND/UL (ref 3.8–10.8)

## 2025-01-06 ENCOUNTER — PATIENT MESSAGE (OUTPATIENT)
Dept: INTERNAL MEDICINE CLINIC | Facility: CLINIC | Age: 45
End: 2025-01-06

## 2025-01-06 ENCOUNTER — OFFICE VISIT (OUTPATIENT)
Dept: INTERNAL MEDICINE CLINIC | Facility: CLINIC | Age: 45
End: 2025-01-06
Payer: COMMERCIAL

## 2025-01-06 ENCOUNTER — HOSPITAL ENCOUNTER (OUTPATIENT)
Dept: GENERAL RADIOLOGY | Age: 45
Discharge: HOME | End: 2025-01-06
Attending: PHYSICIAN ASSISTANT
Payer: COMMERCIAL

## 2025-01-06 VITALS
WEIGHT: 229.19 LBS | HEART RATE: 79 BPM | SYSTOLIC BLOOD PRESSURE: 118 MMHG | TEMPERATURE: 97 F | DIASTOLIC BLOOD PRESSURE: 72 MMHG | BODY MASS INDEX: 33.95 KG/M2 | HEIGHT: 69 IN | OXYGEN SATURATION: 99 % | RESPIRATION RATE: 18 BRPM

## 2025-01-06 DIAGNOSIS — J32.9 FREQUENT SINUS INFECTIONS: ICD-10-CM

## 2025-01-06 DIAGNOSIS — G40.209 PARTIAL EPILEPSY WITH IMPAIRMENT OF CONSCIOUSNESS (HCC): ICD-10-CM

## 2025-01-06 DIAGNOSIS — M24.80 GENERALIZED ARTICULAR HYPERMOBILITY: ICD-10-CM

## 2025-01-06 DIAGNOSIS — R20.2 RIGHT LEG PARESTHESIAS: ICD-10-CM

## 2025-01-06 DIAGNOSIS — F41.9 ANXIETY: ICD-10-CM

## 2025-01-06 DIAGNOSIS — Z12.83 SCREENING EXAM FOR SKIN CANCER: ICD-10-CM

## 2025-01-06 DIAGNOSIS — Z00.00 ROUTINE GENERAL MEDICAL EXAMINATION AT A HEALTH CARE FACILITY: Primary | ICD-10-CM

## 2025-01-06 DIAGNOSIS — E61.1 IRON DEFICIENCY: ICD-10-CM

## 2025-01-06 DIAGNOSIS — G43.809 OTHER MIGRAINE WITHOUT STATUS MIGRAINOSUS, NOT INTRACTABLE: ICD-10-CM

## 2025-01-06 DIAGNOSIS — Z12.11 SCREEN FOR COLON CANCER: ICD-10-CM

## 2025-01-06 PROCEDURE — 99396 PREV VISIT EST AGE 40-64: CPT | Performed by: PHYSICIAN ASSISTANT

## 2025-01-06 PROCEDURE — 72100 X-RAY EXAM L-S SPINE 2/3 VWS: CPT | Performed by: PHYSICIAN ASSISTANT

## 2025-01-06 RX ORDER — METHYLPREDNISOLONE 4 MG/1
TABLET ORAL
Qty: 1 EACH | Refills: 0 | Status: SHIPPED | OUTPATIENT
Start: 2025-01-06

## 2025-01-06 NOTE — PROGRESS NOTES
Kaycee Javier is a 44 year old female.    Chief Complaint   Patient presents with    Well Adult     EJ Rm 3- Pt is here for yearly physical  Reviewed Preventative/Wellness form with patient.]       Patient complains of:    Right leg feels hot and feels like it is falling asleep x 1 month. Notices while laying down.   Better when up and walking. Low back feels tight. Denies injury/trauma.   Denies weakness.     Anxiety. Stress with kids. Daughter was diagnosed with PCOS. Son has cognitive disabilities and just turned 18 so she is trying to navigate guardianship issues.     Medications Ordered Prior to Encounter[1]     Past Medical History:    ASTHMA    Epilepsy (HCC)    HEADACHES    migraines     Past Surgical History:   Procedure Laterality Date      x 2    Repair of rebecaertoe,one  2012    Procedure: TOTAL METATARSAL PHALANGEAL JOINT REPLACEMENT;  Surgeon: Que Bailey DPM;  Location: [facility] [place], [facility]    [place] teeth removed       Family History   Problem Relation Age of Onset    Hypertension Father     Lipids Father     Obesity Father     Other (Other) Father         emphysema     Hypertension Mother     Heart Disorder Mother         heart murmur     Other (osteoporosis) Mother     Cancer Paternal Grandfather         lung cancer     Other (lung cancer) Paternal Grandfather     Other (osteoporosis) Maternal Grandmother     Other (Other) Maternal Grandfather         alzheimers        Social History     Socioeconomic History    Marital status:    Tobacco Use    Smoking status: Never    Smokeless tobacco: Never   Vaping Use    Vaping status: Never Used   Substance and Sexual Activity    Alcohol use: Yes     Comment: social    Drug use: No    Sexual activity: Yes     Partners: Male     Birth control/protection: Vasectomy   Other Topics Concern    Caffeine Concern Yes    Exercise No         HEALTH MAINTENANCE:     Health Maintenance Due   Topic Date Due    Colorectal Cancer  Screening  Never done    COVID-19 Vaccine (5 - 2024-25 season) 09/01/2024    Annual Physical  12/18/2024    Annual Depression Screening  01/01/2025       -  09/16/2021 07/09/2024      REVIEW OF SYSTEMS:   GENERAL: feels well otherwise  SKIN: denies any unusual skin lesions  EYES:denies blurred vision or double vision  HEENT: denies nasal congestion, sinus pain or ST  LUNGS: denies shortness of breath with exertion  CARDIOVASCULAR: denies chest pain on exertion  GI: denies abdominal pain,denies heartburn  : denies dysuria, vaginal discharge or itching  MUSCULOSKELETAL: + back pain  NEURO: + headaches  PSYCHE: + depression or anxiety  HEMATOLOGIC: + hx of anemia  ENDOCRINE: denies thyroid history  ALL/ASTHMA: denies hx of allergy or asthma    EXAM:   /72   Pulse 79   Temp 96.9 °F (36.1 °C) (Temporal)   Resp 18   Ht 5' 9\" (1.753 m)   Wt 229 lb 3.2 oz (104 kg)   LMP 12/27/2024 (Exact Date)   SpO2 99%   BMI 33.85 kg/m²   GENERAL: well developed, well nourished,in no apparent distress  SKIN: no rashes,no suspicious lesions  HEENT: atraumatic, normocephalic,ears and throat are clear  EYES:PERRLA, EOMI, conjunctiva are clear  NECK: supple,no adenopathy  CHEST: no chest tenderness  BREAST: no dominant or suspicious mass  LUNGS: clear to auscultation  CARDIO: RRR without murmur  GI: good BS's,no masses, HSM or tenderness  GYNE/:   EXTERNAL GENITALIA: normal, no lesions   VAGINA: normal, scant discharge   CERVIX: normal, no lesions   BIMANUAL: no cervical motion tenderness, no adnexal masses or tenderness   MUSCULOSKELETAL: back is not tender,FROM of the back  EXTREMITIES: no cyanosis, clubbing or edema  NEURO: Oriented times three,cranial nerves are intact,motor and sensory are grossly intact    ASSESSMENT AND PLAN:   1. Routine general medical examination at a health care facility  Pt completed labs through her 's work - she will submit results for me to review   Due for FIT - cards given    Mammogram due 7/2025  Pap due 2026     2. Partial epilepsy with impairment of consciousness (HCC)  Stable   Followed by neuro     4. Other migraine without status migrainosus, not intractable  Stable   No acute complaints   Followed by neuro     5. Anxiety  Stable   Working with functional med     6. Generalized articular hypermobility  Stable   No acute complaints     7. Frequent sinus infections  Stable   No acute complaints     8. Iron deficiency  Cannot tolerate po supplements   Ferritin low again   She has done well with iron infusions in the past - referred to hematology   - Oncology/Hematology Referral - In Network    9. Screening exam for skin cancer  See derm for full skin exam   - Derm Referral - In Network    10. Right leg paresthesias  Trial of medrol navid   Check lumbar xray   Will likely need lumbar MRI vs EMG if persists   - methylPREDNISolone (MEDROL) 4 MG Oral Tablet Therapy Pack; Take as directed.  Dispense: 1 each; Refill: 0  - XR LUMBAR SPINE (MIN 2 VIEWS) (CPT=72100); Future    11. Screen for colon cancer  Stool cards given   - Occult Blood, Fecal, Immunoassay (Blue cards) [E]      Pt' s weight is   Wt Readings from Last 6 Encounters:   01/06/25 229 lb 3.2 oz (104 kg)   11/11/24 225 lb (102.1 kg)   06/20/24 227 lb (103 kg)   03/22/24 229 lb 9.6 oz (104.1 kg)   02/08/24 225 lb 6.4 oz (102.2 kg)   12/18/23 220 lb 9.6 oz (100.1 kg)   , Body mass index is 33.85 kg/m². recommended low fat diet and aerobic exercise 30 minutes three times weekly.     Encounter Diagnoses   Name Primary?    Routine general medical examination at a health care facility Yes    Partial epilepsy with impairment of consciousness (HCC)     Other migraine without status migrainosus, not intractable     Anxiety     Generalized articular hypermobility     Frequent sinus infections     Iron deficiency     Screening exam for skin cancer     Right leg paresthesias     Screen for colon cancer        Orders Placed This Encounter    Procedures    Occult Blood, Fecal, Immunoassay (Blue cards) [E]       Meds and Refills:  Requested Prescriptions     Signed Prescriptions Disp Refills    methylPREDNISolone (MEDROL) 4 MG Oral Tablet Therapy Pack 1 each 0     Sig: Take as directed.       Imaging and Referrals:  DERM - INTERNAL  OP REFERRAL TO HEMATOLOGY/ONCOLOGY GROUP  XR LUMBAR SPINE (MIN 2 VIEWS) (CPT=72100)    The patient indicates understanding of these issues and agrees to the plan.  The patient is asked to return for CPX in one year.    Aniya Castañeda PA-C             [1]   Current Outpatient Medications on File Prior to Visit   Medication Sig Dispense Refill    levetiracetam (KEPPRA) 1000 MG Oral Tab Take 1 tablet (1,000 mg total) by mouth 2 (two) times daily. 180 tablet 3    cholecalciferol 50 MCG (2000 UT) Oral Cap Take 1 capsule (2,000 Units total) by mouth in the morning and 1 capsule (2,000 Units total) before bedtime.      predniSONE 20 MG Oral Tab       Multiple Vitamin (MULTI-VITAMIN DAILY) Oral Tab Take by mouth.      Betamethasone Dipropionate Aug 0.05 % External Cream Apply to affected skin bid prn 60 g 1    Fluticasone Propionate 50 MCG/ACT Nasal Suspension 2 sprays by Each Nare route daily. 3 Bottle 3    Albuterol Sulfate  (90 Base) MCG/ACT Inhalation Aero Soln INHA E 1 TO 2 PUFFS EVERY 4 TO 6 HOURS AS NEEDED 1 Inhaler 2    Ascorbic Acid (VITAMIN C) 100 MG Oral Tab Take 1 tablet (100 mg total) by mouth daily.      EPINEPHrine 0.3 MG/0.3ML Injection Solution Auto-injector  (Patient not taking: Reported on 2/8/2024)       No current facility-administered medications on file prior to visit.

## 2025-01-07 ENCOUNTER — PATIENT MESSAGE (OUTPATIENT)
Dept: INTEGRATIVE MEDICINE | Facility: CLINIC | Age: 45
End: 2025-01-07

## 2025-01-08 NOTE — TELEPHONE ENCOUNTER
Spoke with patient. Patient states she received a message from [facility] stating she has pending labs to do, notified patient MISC test weren't drawn and will add them to her quest order. Patient verbalized understanding and is going to lab.

## 2025-01-14 NOTE — TELEPHONE ENCOUNTER
Labs stable. No significant abnormalities.   
Pt attached lab results as discussed at yesterday's OV for your review.    LOV 1/6/24:    ASSESSMENT AND PLAN:   1. Routine general medical examination at a health care facility  Pt completed labs through her 's work - she will submit results for me to review   Due for FIT - cards given   Mammogram due 7/2025  Pap due 2026        
Pt notified via reply to [facility] message.  
no

## 2025-01-22 LAB
ALPHA MELANOCYTE STIMULATING HORMONE: 34.6 PG/ML (ref 0–100)
C3A DESARG FRAGMENT: 28 NG/ML (ref 55–486)
COMPLEMENT COMPONENT C4A, PLASMA: 1023 NG/ML
MATRIX METALLOPROTEINASE 9 (MMP 9): 1183 NG/ML (ref 0–900)

## 2025-01-22 RX ORDER — FAMOTIDINE 10 MG/ML
20 INJECTION, SOLUTION INTRAVENOUS ONCE
Start: 2025-01-22 | End: 2025-01-22

## 2025-01-28 ENCOUNTER — OFFICE VISIT (OUTPATIENT)
Age: 45
End: 2025-01-28
Attending: INTERNAL MEDICINE
Payer: COMMERCIAL

## 2025-01-28 VITALS
HEART RATE: 64 BPM | TEMPERATURE: 98 F | RESPIRATION RATE: 16 BRPM | OXYGEN SATURATION: 99 % | SYSTOLIC BLOOD PRESSURE: 119 MMHG | DIASTOLIC BLOOD PRESSURE: 80 MMHG

## 2025-01-28 DIAGNOSIS — E61.1 IRON DEFICIENCY: Primary | ICD-10-CM

## 2025-01-28 RX ORDER — FAMOTIDINE 10 MG/ML
20 INJECTION, SOLUTION INTRAVENOUS ONCE
Status: COMPLETED | OUTPATIENT
Start: 2025-01-28 | End: 2025-01-28

## 2025-01-28 RX ORDER — FAMOTIDINE 10 MG/ML
20 INJECTION, SOLUTION INTRAVENOUS ONCE
Status: CANCELLED
Start: 2025-01-28 | End: 2025-01-28

## 2025-01-28 RX ADMIN — FAMOTIDINE 20 MG: 10 INJECTION, SOLUTION INTRAVENOUS at 14:57:00

## 2025-01-28 NOTE — PROGRESS NOTES
Education Record    Learner:  Patient    Disease / Diagnosis: here for infed    Barriers / Limitations:  None    Method:  Brief focused, and  reinforcement    General Topics:  Plan of care reviewed    Outcome: patient ambulatory. No complaints. Tolerated infusion. VSS. Shows understanding. Discharged in stable condition

## 2025-02-18 ENCOUNTER — TELEMEDICINE (OUTPATIENT)
Dept: INTEGRATIVE MEDICINE | Facility: CLINIC | Age: 45
End: 2025-02-18
Payer: COMMERCIAL

## 2025-02-18 DIAGNOSIS — R45.86 MOOD CHANGES: ICD-10-CM

## 2025-02-18 DIAGNOSIS — G89.29 OTHER CHRONIC PAIN: ICD-10-CM

## 2025-02-18 DIAGNOSIS — R79.89 HIGH SERUM CORTISOL: ICD-10-CM

## 2025-02-18 DIAGNOSIS — N92.4 EXCESSIVE BLEEDING IN PREMENOPAUSAL PERIOD: Primary | ICD-10-CM

## 2025-02-18 DIAGNOSIS — G47.9 SLEEP DISTURBANCE: ICD-10-CM

## 2025-02-18 DIAGNOSIS — R14.0 BLOATING: ICD-10-CM

## 2025-02-18 PROCEDURE — 98006 SYNCH AUDIO-VIDEO EST MOD 30: CPT | Performed by: PHYSICIAN ASSISTANT

## 2025-02-18 RX ORDER — PROGESTERONE 100 MG/1
CAPSULE ORAL
Qty: 90 CAPSULE | Refills: 0 | Status: SHIPPED | OUTPATIENT
Start: 2025-02-18

## 2025-02-18 NOTE — PROGRESS NOTES
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       Citrus bioflavonoid complex [standardized to 45% hesperidin], green coffee bean extract [standardized to 45% chlorogenic acid], pomegranate whole fruit extract [standardized to 43.2 mg gallic acid equivalents (GAE)], grape seed extract [standardized to 85% oligomeric proanthocyanidins], blueberry (Vaccinium spp.) fruit extract [standardized to 20% total polyphenols and 15% anthocyanins], green tea leaf extract [standardized to 60% catechins and 40% EGCG], bitter melon fruit extract, prune skin extract [standardized to 50% polyphenols], watercress aerial parts 4:1 extract, Chinese cinnamon (Cinnamomum waqar) bark powder,  gum Arabic tree bark and heartwood extract [standardized to 6% catechins], rosemary extract [standardized to 7.6% min sum of carnosol+carnosic acid], artichoke leaf extract [containing cynarin and chlorogenic acid]  Edgar-Inositol … 25 mg *  Resveratrol … 10 mg*  (from Polygonum cuspidatum root extract)  Lutein  … 6 mg*  Lycopene … 6 mg*  Zeaxanthin … 2 mg      Stop Vitamin C for now while on GI hist support     Chastetree - vitex - continue     Cortisol manager - stop for now         We will  continue to monitor MMP9, TGF beta and complement pathway     No follow-ups on file.    Patient affirmed understanding of plan and all questions were answered.     Ally Lee PA-C         [1]   Allergies  Allergen Reactions    Bees SWELLING    Other SWELLING     Bug bites    Antihistamine [Dexbrompheniramine-Pseudoeph]      All antihistamines interfere with epilepsy meds    Dilantin [Phenytoin Sodium]      Interferes with epilepsy meds

## 2025-02-18 NOTE — PATIENT INSTRUCTIONS
Plan   1) Day 12-28 of your cycle   Day one is first day of bleeding   Take progesterone at night   0 we will re evaluate if estrogen or testosterone is needed   - help with sleep and have calming affect    2)       Serving Size: 1 vegetarian capsule at night before bed .     Amount Per Serving  Magnesium ... 120mg  (as magnesium glycinate)      3) c3a low, tgf beta elevated, mmp 9 elevated - I would like you help balance immune response      GI Hist Support  Neurobiologix      As a daily maintenance support product, take 2 capsules in AM and 2 capsules in PM.  Monitor inflammation on supplement       Serving Size: 2 Vegetable Capsules     Amount Per Serving  Vitamin C ... 200mg  (Ascorbic Acid)  Quercetin ... 100mg  Porcine Kidney ... 300mg  Alpha Lipoic Acid ... 100mg  Stinging Nettle Root 4:1 Extract ... 50mg  (Urtica dioica) (Equivalent to 200mg of Root)  Bromelain ... 100mg  (7 GDU)     Stay on multivitamin and   When you replenish vitamin D3 have it also have k2    When you are out of blue bonnet     Serving size: 2 Tablets         Amount Per Serving  Total Carbohydrate ... <1g  Dietary Fiber ... <1g    Vitamin A … 3,000 mcg  (from mixed carotenoids and retinyl acetate)            Vitamin C … 120 mg  (as ascorbic acid and ascorbyl palmitate)       Vitamin D … 25mcg (1,000 IU)  (as cholecalciferol)  Vitamin E … 67 mg  (as d-alpha tocopheryl succinate)       Vitamin K … 120 mcg  (as phytonadione USP)  Thiamin … 25 mg  (as thiamin mononitrate)  Riboflavin … 15 mg  Niacin … 50 mg  (as niacinamide and niacin)  Vitamin B6 … 25 mg  (as pyridoxine HCl)       Folate … 1,360 mcg DFE  (as calcium L-5-methyltetrahydrofolate)†  Vitamin B12 … 200 mcg  (as methylcobalamin)  Biotin … 500 mcg    Pantothenic Acid … 75 mg  (as D-calcium pantothenate)   Choline … 25 mg  (as choline bitartrate)  Iodine … 150 mcg  (as potassium iodide)  Magnesium … 40 mg  (as magnesium citrate)  Zinc … 15 mg  (as zinc citrate)  Selenium … 100 mcg     (as selenium aspartate)  Copper … 1 mg  (as copper citrate)       Manganese … 0.5 mg  (as manganese citrate)  Chromium … 200 mcg  (as chromium polynicotinate)  Molybdenum … 50 mcg  (as molybdenum aspartate complex)  Proprietary Phytonutrient Blend … 400 mg*       Citrus bioflavonoid complex [standardized to 45% hesperidin], green coffee bean extract [standardized to 45% chlorogenic acid], pomegranate whole fruit extract [standardized to 43.2 mg gallic acid equivalents (GAE)], grape seed extract [standardized to 85% oligomeric proanthocyanidins], blueberry (Vaccinium spp.) fruit extract [standardized to 20% total polyphenols and 15% anthocyanins], green tea leaf extract [standardized to 60% catechins and 40% EGCG], bitter melon fruit extract, prune skin extract [standardized to 50% polyphenols], watercress aerial parts 4:1 extract, Chinese cinnamon (Cinnamomum waqar) bark powder, Pakistani gum Arabic tree bark and heartwood extract [standardized to 6% catechins], rosemary extract [standardized to 7.6% min sum of carnosol+carnosic acid], artichoke leaf extract [containing cynarin and chlorogenic acid]  Edgar-Inositol … 25 mg *  Resveratrol … 10 mg*  (from Polygonum cuspidatum root extract)  Lutein  … 6 mg*  Lycopene … 6 mg*  Zeaxanthin … 2 mg      Stop Vitamin C for now while on GI hist support     Yamiletstetrcarin - vitex - continue     Cortisol manager - stop for now         We will continue to monitor MMP9, TGF beta and complement pathway

## 2025-02-26 LAB
DHEA SULFATE: 76 MCG/DL (ref 15–205)
ESTRADIOL, ULTRASENSITIVE$/MS/MS: 59 PG/ML
ESTRIOL, /MS/MS, SERUM: <0.1 NG/ML
ESTRONE, /MS/MS: 41 PG/ML
FREE TESTOSTERONE: 1.7 PG/ML (ref 0.1–6.4)
FSH: 3.1 MIU/ML
LH: 2.1 MIU/ML
PROGESTERONE: 6.2 NG/ML
TESTOSTERONE, TOTAL,$/MS/MS: 16 NG/DL (ref 2–45)

## 2025-03-07 ENCOUNTER — PATIENT MESSAGE (OUTPATIENT)
Dept: INTERNAL MEDICINE CLINIC | Facility: CLINIC | Age: 45
End: 2025-03-07

## 2025-04-21 ENCOUNTER — OFFICE VISIT (OUTPATIENT)
Dept: RHEUMATOLOGY | Facility: CLINIC | Age: 45
End: 2025-04-21
Payer: COMMERCIAL

## 2025-04-21 VITALS
WEIGHT: 232 LBS | DIASTOLIC BLOOD PRESSURE: 80 MMHG | HEART RATE: 68 BPM | OXYGEN SATURATION: 100 % | HEIGHT: 69 IN | SYSTOLIC BLOOD PRESSURE: 138 MMHG | RESPIRATION RATE: 16 BRPM | BODY MASS INDEX: 34.36 KG/M2 | TEMPERATURE: 98 F

## 2025-04-21 DIAGNOSIS — E55.9 VITAMIN D DEFICIENCY: ICD-10-CM

## 2025-04-21 DIAGNOSIS — R20.0 NUMBNESS AND TINGLING: ICD-10-CM

## 2025-04-21 DIAGNOSIS — M25.50 HYPERMOBILITY ARTHRALGIA: ICD-10-CM

## 2025-04-21 DIAGNOSIS — M79.18 CHRONIC MYOFASCIAL PAIN: ICD-10-CM

## 2025-04-21 DIAGNOSIS — R53.82 CHRONIC FATIGUE: ICD-10-CM

## 2025-04-21 DIAGNOSIS — M25.50 POLYARTHRALGIA: Primary | ICD-10-CM

## 2025-04-21 DIAGNOSIS — G89.29 CHRONIC MYOFASCIAL PAIN: ICD-10-CM

## 2025-04-21 DIAGNOSIS — R20.2 NUMBNESS AND TINGLING: ICD-10-CM

## 2025-04-21 DIAGNOSIS — R79.0 LOW FERRITIN: ICD-10-CM

## 2025-04-21 PROCEDURE — 99215 OFFICE O/P EST HI 40 MIN: CPT | Performed by: INTERNAL MEDICINE

## 2025-04-21 NOTE — PROGRESS NOTES
?  RHEUMATOLOGY FOLLOW UP    Date of visit: 04/21/2025  ?  Chief Complaint   Patient presents with    Follow - Up     18 month f/u. Having more joint pain. Dealing with some life stressors right now. Converted rapid score of 3.3       ASSESSMENT, DISCUSSION & PLAN   Assessment:  1. Polyarthralgia    2. Hypermobility arthralgia    3. Chronic myofascial pain    4. Chronic fatigue    5. Numbness and tingling    6. Vitamin D deficiency    7. Low ferritin        Discussion:  Ms. Kaycee Javier is a 43 yo who initially presented for evaluation of somewhat diffuse aches and chronic fatigue.  She has been dealing with chronic left ankle pain and swelling for which she is seen multiple specialists.  She does have tenosynovitis noted on her MRI of the left ankle/foot.  Unclear if this is related to strains/osteoarthritis which was noted in the MRI versus an autoimmune/inflammatory condition.  Given her plethora of symptoms, I recommended that she get autoimmune work-up completed.    Her labs were grossly negative (DOUGLAS was 1:40 which is not truly positive). Remainder of labs normal too including HLAB27,  Inflammatory markers, RA studies as well as complements and immunoglobulins. Discussed that there is no obvious inflammatory/autoimmune etiology to her symptoms.  She lacked tender point positivity associated with fibromyalgia but had symptoms of chronic myofascial pain syndrome.    She has been lost to follow up for about 18m. She is still dealing with stress and her joint pain/fatigue.  Now dealing primarily with the feet/ankles. Seen by podiatry and is working on adjusting shoewear.  Also back/hip pain and has to get MRI ordered by other provider.   She is still dealing with pain in the hands and feels more swollen now but states not sure if related to weight gain. Still with sicca complaints  Some rashes but not sure if related to food exposure.     At this time, I reminded pt that she should try Harjeet Chi or  qi-gong stretches as well as encouraged her to incorporate more meditation and mindfulness daily. She had signs of hypermobility on her exam of the upper extremities which I encouraged her to discuss with PT vs see chiro that specializes in hyper mobility.     Otherwise, I recommend we continue present management. She will continue with PT and water aerobics and stretching.   Can consider something like gabapentin vs amitriptyline if symptoms worsen in the future.   She was instructed to also get updated labs.     Otherwise, okay to follow up in another 6 months or sooner as needed.     Patient verbalized understanding of above instructions. No further questions at this time.    Code selection for this visit was based on time spent (40min) on date of service in preparing to see the patient, obtaining and/or reviewing separately obtained history, performing a medically appropriate examination, counseling and educating the patient/family/caregiver, ordering medications or testing, referring and communicating with other healthcare providers, documenting clinical information in the E HR, independently interpreting results and communicating results to the patient/family/caregiver and care coordination with the patient's other providers.      ?  Plan:  Diagnoses and all orders for this visit:    Polyarthralgia  -     C-Reactive Protein  -     Sed Rate, Westergren (Automated)  -     Complement C3, Serum  -     Complement C4, Serum  -     Immunoglobulin A/G/M, Quant  -     ANAlyzeR DOUGLAS, IFA with Reflex Titer/Pattern, Systemic Autoimmune Panel 1 [58670] [Q]  -     Iron And Tibc  -     Ferritin  -     Vitamin D; Future  -     Vitamin B12    Hypermobility arthralgia    Chronic myofascial pain    Chronic fatigue  -     C-Reactive Protein  -     Sed Rate, Westergren (Automated)  -     Complement C3, Serum  -     Complement C4, Serum  -     Immunoglobulin A/G/M, Quant  -     ANAlyzeR DOUGLAS, IFA with Reflex Titer/Pattern, Systemic  Autoimmune Panel 1 [[id number]] [Q]  -     Iron And Tibc  -     Ferritin  -     Vitamin D; Future  -     Vitamin B12    Numbness and tingling  -     Vitamin B12    Vitamin D deficiency  -     Vitamin D; Future    Low ferritin  -     Iron And Tibc  -     Ferritin          Return in about 6 months (around 10/21/2025).  ?  HPI   Kaycee Javier is a 44 year old female with the following active problems who was seen initially for evaluation of joint and muscle pain. She was diagnosed with chronic pain syndrome.     Since her last visit, she has been doing okay, but still having a lot of issues.   Her primary issues are the feet and ankles right now. Primarily along the bottom and to the heel but the left is the lateral and up the ankle  Feels more like a burning pain.   Got custom insoles but continues with pain.   Thinks some of this is related to weight gain- 20# over the past 1.5 years- thinks multifactorial from lack of exercise, hormones, diet and stress.   Was doing some PT exercises but felt like it made it worse  Is considering seeing a myofascial release provider- seen 3x so far and has felt better.  Some numbness/tingling in the right thigh/hip when laying on back. Had MRI ordered by PCP office but has not completed     Knee pain has not been that bad.   Arm tingling not as bad.   Feels tightness in quads/hamstrings  Prior right wrist pain not as bad as before. Now intermittent but can radiate to the thumb.   Has noticed hands are bigger than before ?weight vs swelling  + cold hands and feet- turning purplish or blue. Never sores/ulcers.     Denies rashes but feels itching over her neck. Not sure if related to pineapple. Applied steroids which helped but is still having itching despite avoiding pineapple.     Still suffering from significant fatigue  Some tenderness to the touch in different areas.   Still intermittent migraines. Following with neurology.     Has been having more pain in general when  exercising but feels like injuring herself. Does have popping of her shoulders and hips.   Last iron infusion at the end of Jan     + dry eyes  + dry mouth  + dry nose     HPI from initial consultation  referred for rheumatologic evaluation due to joint and muscle pain.     Hx of left ankle injury in 2017 and 2019. Once was getting off treadmill and another time she tripped and fell. Was wearing boot for sprain.   Then in 2020, she was trying to walk daily and when she tried to go running, felt unstable and couldn't do that  Then in May of 2021, developed left ankle pain and swelling. Has been seen by two different podiatrist and ortho- one recommended surgical intervention and the other recommended PT. One thought related to achilles.     Feels she has significant mood swings- feels multiple factors. she has a son who is now taller/bigger than she is who has autistim spectrum disorder and has been under a lot of stress, given he lashes out on her physically at time. Her daughter has had multiple health concerns as well (requiring cardiac ablation twice as well as other issues).     Admits to significant fatigue. States every afternoon she feels like she hits a wall around 2-3pm. Does wake up multiple times at night. Wakes up feeling rested.     Does admit to sensitivity to the touch over her body.- typically over her legs and upper arms. Feels like a massage would be helpful but is more painful.   States not constant but sometimes worse than other. Left side worse than right.  Feels calf swelling intermittently - has had US done which was negative for clot  Tries to do yoga, but feels worsen between the muscle tightness vs the ankle.   Has had tightness of her muscle prior to that (such as ITB and going through PT).     + morning stiffness, felt more in the upper back and the legs- lasts for less than 10 minutes, gets better after stretching/movement. Can return later in the day if sitting for prolonged period of  time.     + sensitive to nosebleeds, more noted with multiple covid testing but no actual frequent epistaxis   + hx of chronic sinus   + hx of migraines since childhood     Followed with functional medicine provider recently and is in the process of getting work up- told iron deficiency and has plans to get results for food sensitivities     + hx of reaction to bug bites.   + possible Raynaud's   + mild dry eyes, wears contacts; was seen optometrist and wasn't told look dry; does not use eye drops  + mild dry mouth; denies recurrent cavities  + jaw pain and thinks she clenches her teeth. Supposed to wear night splint.     + epilepsy on Keppra- grand mal age 9yo. Never found etiology. Never been off medication since then and scared to get off due to caring for her kids. Was previously on depakote and other medication   + hx of parvovirus during pregnancy with her son; baby breeched and apneic after delivery- he is autism spectrum  + placental rupture early pregnancy and required bed rest.       The patient denies hair loss, oral or nasal ulcers, photosensitive rash, elevated or scarring rashes, prior hematologic abnormality, prior renal or liver disease.  No history of prior blood clot in the legs or lungs, strokes or ischemic phenomenon, or prior miscarriages.  Denies nonhealing ulcers on the fingertips, skin calcifications, trouble swallowing, or severe acid reflux.  The patient denies any history of uveitis, crampy abdominal pain, diarrhea, bloody stools, nodular painful shin bruises, psoriatic lesions, spooning or pitting of the nails, or history of dactylitis.  No fevers, chills, lymphadenopathy, night sweats, unexpected weight loss, easy bruising or bleeding, or unexplained weakness.    Family hx:   Son with autism spectrum and eosophilic esophagitis       Past Medical History:  Past Medical History:    ASTHMA    Epilepsy (HCC)    HEADACHES    migraines     Past Surgical History:  Past Surgical History:    Procedure Laterality Date      x 2    Repair of jany goldsmith  2012    Procedure: TOTAL METATARSAL PHALANGEAL JOINT REPLACEMENT;  Surgeon: Que Bailey DPM;  Location: [facility]    [place] teeth removed       Family History:  Family History   Problem Relation Age of Onset    Hypertension Father     Lipids Father     Obesity Father     Other (Other) Father         emphysema     Hypertension Mother     Heart Disorder Mother         heart murmur     Other (osteoporosis) Mother     Cancer Paternal Grandfather         lung cancer     Other (lung cancer) Paternal Grandfather     Other (osteoporosis) Maternal Grandmother     Other (Other) Maternal Grandfather         alzheimers      Social History:  Social History     Socioeconomic History    Marital status:    Tobacco Use    Smoking status: Never    Smokeless tobacco: Never   Vaping Use    Vaping status: Never Used   Substance and Sexual Activity    Alcohol use: Yes     Comment: social    Drug use: No    Sexual activity: Yes     Partners: Male     Birth control/protection: Vasectomy   Other Topics Concern    Caffeine Concern Yes    Exercise No     Social Drivers of Health      Received from [facility]    Housing Stability     Medications:  Outpatient Medications Marked as Taking for the 25 encounter (Office Visit) with Regina Reeves DO   Medication Sig Dispense Refill    progesterone 100 MG Oral Cap Take days 12-28 of menstrual cycle at night  Disp 90 refill 1 90 capsule 0    methylPREDNISolone (MEDROL) 4 MG Oral Tablet Therapy Pack Take as directed. 1 each 0    levetiracetam (KEPPRA) 1000 MG Oral Tab Take 1 tablet (1,000 mg total) by mouth 2 (two) times daily. 180 tablet 3    cholecalciferol 50 MCG (2000 UT) Oral Cap Take 1 capsule (2,000 Units total) by mouth in the morning and 1 capsule (2,000 Units total) before bedtime.      predniSONE 20 MG Oral Tab       Multiple Vitamin (MULTI-VITAMIN DAILY) Oral  Tab Take by mouth.      Betamethasone Dipropionate Aug 0.05 % External Cream Apply to affected skin bid prn 60 g 1    EPINEPHrine 0.3 MG/0.3ML Injection Solution Auto-injector       Fluticasone Propionate 50 MCG/ACT Nasal Suspension 2 sprays by Each Nare route daily. 3 Bottle 3    Albuterol Sulfate  (90 Base) MCG/ACT Inhalation Aero Soln INHA E 1 TO 2 PUFFS EVERY 4 TO 6 HOURS AS NEEDED 1 Inhaler 2     Modified Medications    No medications on file     Medications Discontinued During This Encounter   Medication Reason    Ascorbic Acid (VITAMIN C) 100 MG Oral Tab      ?  ?  Allergies:  Allergies   Allergen Reactions    Bees SWELLING    Other SWELLING     Bug bites    Antihistamine [Dexbrompheniramine-Pseudoeph]      All antihistamines interfere with epilepsy meds    Dilantin [Phenytoin Sodium]      Interferes with epilepsy meds     ?  REVIEW OF SYSTEMS   ?  Review of Systems   Constitutional:  Positive for malaise/fatigue. Negative for chills, fever and weight loss.   HENT:  Positive for congestion and nosebleeds (blood d/t dryness).    Eyes:  Positive for photophobia (with migraines). Negative for pain and redness.   Respiratory:  Positive for shortness of breath (on exertion) and wheezing. Negative for cough and hemoptysis.    Cardiovascular:  Positive for palpitations. Negative for chest pain and leg swelling.        + muscular chest pain   Gastrointestinal:  Positive for constipation and heartburn. Negative for abdominal pain, blood in stool, diarrhea and nausea.   Genitourinary:  Negative for dysuria, frequency, hematuria and urgency.   Musculoskeletal:  Positive for joint pain, myalgias and neck pain. Negative for back pain.   Skin:  Positive for itching. Negative for rash.   Neurological:  Positive for dizziness (rarely), tingling (fingers, left worse than right) and headaches. Negative for seizures (none recently) and weakness.   Endo/Heme/Allergies:  Positive for environmental allergies. Bruises/bleeds  easily.     PHYSICAL EXAM   Today's Vitals:  Temperature Blood Pressure Heart Rate Resp Rate SpO2   Temp: 97.9 °F (36.6 °C) BP: 138/80 Pulse: 68 Resp: 16 SpO2: 100 %   ?  Current Weight Height BMI BSA Pain   Wt Readings from Last 1 Encounters:   04/21/25 232 lb (105.2 kg)    Height: 5' 9\" (175.3 cm) Body mass index is 34.26 kg/m². Body surface area is 2.2 meters squared.         Physical Exam  Vitals and nursing note reviewed.   Constitutional:       General: She is not in acute distress.     Appearance: Normal appearance. She is well-developed. She is not diaphoretic.   HENT:      Head: Normocephalic.   Eyes:      General: No scleral icterus.     Extraocular Movements: Extraocular movements intact.      Conjunctiva/sclera: Conjunctivae normal.   Neck:      Vascular: No JVD.      Trachea: No tracheal deviation.   Cardiovascular:      Rate and Rhythm: Normal rate and regular rhythm.      Heart sounds: Normal heart sounds. No murmur heard.  Pulmonary:      Effort: Pulmonary effort is normal. No respiratory distress.      Breath sounds: Normal breath sounds. No wheezing.   Musculoskeletal:         General: Tenderness present. No swelling.      Cervical back: Neck supple.      Comments: No evidence of heberden or andrea nodes of any of the fingers, no basilar joint tenderness of the 1st CMC bilaterally.  No swelling, tenderness, redness or restriction of motion of the DIPs, PIPs, MCPs, wrists, elbows, or joints of the feet.  Left ankle full rom but some tenderness to palpation anteriorly and along sides. No achilles tenderness.  Bilateral shoulders with full ROM, no evidence of impingement with provocative maneuvers.  Bilateral knees without medial joint line tenderness, no crepitus, no effusion.  Achilles on right tender.    Lymphadenopathy:      Cervical: No cervical adenopathy.   Skin:     General: Skin is warm and dry.      Findings: No erythema or rash.      Comments: No periungal erythema  No  malar rash     Neurological:      Mental Status: She is alert and oriented to person, place, and time.      Cranial Nerves: No cranial nerve deficit.      Gait: Gait normal.   Psychiatric:         Mood and Affect: Mood normal.         Behavior: Behavior normal.       ?  Radiology review:       MRI Left ankle (will be scanned into chart)   07/01/2021  Impression:  1.  Intermediate to high-grade partial tear of the deep deltoid ligament complex.  2.  Mild anterior talofibular ligament sprain.  3.  Mild peroneus longus and brevis tenosynovitis.  Mild posterior tibial tenosynovitis.  Flexor hallucis muscle strain.  Edema within the pre-Achilles fat pad is nonspecific, and may relate formation.  4.  Mild degenerative changes of the tibiotalar and subtalar joints.  Small tibiotalar joint effusion.  Mild osteoarthritic changes of the talonavicular and naviculocuneiform joints.  5.  Severe fatty atrophy of the abductor digit he muscle raises a possibility of neuropathy.    DATE OF SERVICE: 04.13.2021     US MSK LEFT LEG,COMPLETE(CPT=76881)   CLINICAL INDICATION:  Calf swelling.   COMPARISON:  None available.   TECHNIQUE: Grayscale imaging was performed over the patient's area of subjective concern with a   linear transducer and multiple static images were obtained. Cine images were utilized as needed.   FINDINGS:   There is no evidence of solid or cystic lesion associated with the patient's area of subjective   concern. The visualized subcutaneous and myotendinous structures are homogeneous. There are no   abnormal focal fluid collections. Evaluation of the Achillis tendon and gastrocnemius muscle   demonstrates no sonographic abnormality.   Impression   IMPRESSION: See above..     DATE OF SERVICE: 04.12.2021     US VENOUS DOPPLER LEG LEFT - DIAG IMG (CPT=93971)   CLINICAL INDICATION: 40 years-old Female with  Calf swelling.     COMPARISON STUDY: None available.   TECHNIQUE:  Real-time gray scale ultrasound examination of the deep  venous system of the LEFT lower   extremity was performed and supplemented by Doppler and duplex interrogation with spectral analysis.   FINDINGS:   Duplex and color Doppler interrogation of the LEFT lower extremity deep venous system was performed.   COMMON FEMORAL VEIN: Compressibility, augmentation, and color flow as well as Doppler flow was   demonstrated, withOUT thrombus.   PROXIMAL SUPERFICIAL FEMORAL VEIN: Compressibility, augmentation, and color flow as well as Doppler   flow was demonstrated, withOUT thrombus.   MID SUPERFICIAL FEMORAL VEIN: Compressibility, augmentation, and color flow as well as Doppler flow   was demonstrated, withOUT thrombus.   DISTAL SUPERFICIAL FEMORAL VEIN: Compressibility, augmentation, and color flow as well as Doppler   flow was demonstrated, withOUT thrombus.   POPLITEAL VEIN:  Compressibility, augmentation, and color flow as well as Doppler flow was   demonstrated, withOUT thrombus.     INFRAPOPLITEAL CALF VEINS: Compressibility, augmentation, and color flow as well as Doppler flow was   demonstrated, withOUT thrombus.   The visualized contralateral common femoral vein is patent with normal waveforms.   IMPRESSION   1. NO evidence of deep venous thrombosis of the LEFT lower extremity, as detailed above.      Labs:  Lab Results   Component Value Date    WBC 6.8 12/12/2024    RBC 4.52 12/12/2024    HGB 13.6 12/12/2024    HCT 41.0 12/12/2024     12/12/2024    MCV 90.7 12/12/2024    MCH 30.1 12/12/2024    MCHC 33.2 12/12/2024    RDW 11.8 12/12/2024    NEPRELIM 2.92 05/10/2024    NEPERCENT 61 12/12/2024    LYPERCENT 29.3 12/12/2024    MOPERCENT 7.6 12/12/2024    EOPERCENT 1.8 12/12/2024    BAPERCENT 0.3 12/12/2024    NE 4,148 12/12/2024    LYMABS 1,992 12/12/2024    MOABSO 517 12/12/2024    EOABSO 122 12/12/2024    BAABSO 20 12/12/2024     Lab Results   Component Value Date    GLU 81 12/12/2024    BUN 22 12/12/2024    BUNCREA SEE NOTE: 12/12/2024    CREATSERUM 0.79  12/12/2024    CA 9.6 12/12/2024    ALKPHO 71 12/12/2024    AST 15 12/12/2024    ALT 14 12/12/2024    BILT 0.2 12/12/2024    TP 6.7 12/12/2024    ALB 4.5 12/12/2024    GLOBULIN 2.2 12/12/2024    AGRATIO 2.0 12/12/2024     12/12/2024    K 4.4 12/12/2024     12/12/2024    CO2 27 12/12/2024       Additional Labs:  02/2022  ANCA panel negative  DOUGLAS 1: 40 Nuclear, speckled  Thyroperoxidase antibody negative  14.3.3 negative  CCP negative  RF negative  B2 G negative  ACL negative  C4 21 normal  C3 131 normal  Centromere negative  Lorna 1 negative  SCL 70 negative  SSA, SSB negative  RNP negative  Arizmendi/RNP negative  Arizmendi negative  Chromatin negative  dsDNA negative  IgA, IgG, IgM normal  CK 81 normal  B12 424 normal  ESR 6 normal  CRP 2.0 normal  HLA-B27 negative    01/2022 labs from functional medicine  Ferritin 10 low  At bedtime CRP 2.1 normal  Uric acid 2.6  Calcium 9  Phosphorus 3.3   normal  Hemoglobin 13.2  WBC 4.2  Creatinine 0.75  Magnesium 2.1  AST, ALT, alk phos, GGT low/normal  Serum iron 92 normal  TPO antibody negative vitamin D 39.2 low normal  Thyroglobulin antibody negative    Regina Reeves,   [facility]  04/21/2025

## 2025-05-16 LAB
% SATURATION: 35 % (CALC) (ref 16–45)
C-REACTIVE PROTEIN: 5.2 MG/L
COMPLEMENT COMPONENT C4C: 20 MG/DL (ref 15–57)
FERRITIN: 79 NG/ML (ref 16–232)
IMMUNOGLOBULIN A: 100 MG/DL (ref 47–310)
IMMUNOGLOBULIN G: 860 MG/DL (ref 600–1640)
IMMUNOGLOBULIN M: 50 MG/DL (ref 50–300)
IRON BINDING CAPACITY: 268 MCG/DL (CALC) (ref 250–450)
IRON, TOTAL: 94 MCG/DL (ref 40–190)
VITAMIN B12: 521 PG/ML (ref 200–1100)
VITAMIN D, 25-OH, TOTAL: 63 NG/ML (ref 30–100)

## 2025-05-23 DIAGNOSIS — R45.86 MOOD CHANGES: ICD-10-CM

## 2025-05-23 DIAGNOSIS — R79.89 HIGH SERUM CORTISOL: ICD-10-CM

## 2025-05-23 DIAGNOSIS — N92.4 EXCESSIVE BLEEDING IN PREMENOPAUSAL PERIOD: ICD-10-CM

## 2025-05-23 DIAGNOSIS — G47.9 SLEEP DISTURBANCE: ICD-10-CM

## 2025-05-23 NOTE — TELEPHONE ENCOUNTER
Received fax from [facility] requesting a refill of progesterone 100mg.      A refill request was received for:  Requested Prescriptions     Pending Prescriptions Disp Refills    progesterone 100 MG Oral Cap 90 capsule 0     Sig: Take days 12-28 of menstrual cycle at night  Disp 90 refill 1     Last refill date:  2/18/2025  Qty: 90 capsules and 0  Dx: Excessive bleeding in premenopausal period  Last office visit: 2/18/2025  When is follow up due:     No future appointments.

## 2025-05-25 RX ORDER — PROGESTERONE 100 MG/1
CAPSULE ORAL
Qty: 90 CAPSULE | Refills: 0 | Status: SHIPPED | OUTPATIENT
Start: 2025-05-25

## 2025-06-12 ENCOUNTER — MED REC SCAN ONLY (OUTPATIENT)
Dept: INTERNAL MEDICINE CLINIC | Facility: CLINIC | Age: 45
End: 2025-06-12

## 2025-06-27 DIAGNOSIS — R20.2 RIGHT LEG PARESTHESIAS: ICD-10-CM

## 2025-06-27 RX ORDER — METHYLPREDNISOLONE 4 MG/1
TABLET ORAL
Qty: 1 EACH | Refills: 0 | Status: SHIPPED | OUTPATIENT
Start: 2025-06-27

## 2025-06-27 NOTE — TELEPHONE ENCOUNTER
Patient contacted clinic informing she has had a migraine for the past 2 days and it wont go away.   Patient is requesting that Dr. Morrison send over a AMRIK to help relieve her pain.     Please review.     [id number] - [place] - [address] AT [facility], [phone], [phone]

## 2025-07-24 ENCOUNTER — TELEPHONE (OUTPATIENT)
Dept: NEUROLOGY | Facility: CLINIC | Age: 45
End: 2025-07-24

## 2025-07-24 ENCOUNTER — NURSE ONLY (OUTPATIENT)
Dept: NEUROLOGY | Facility: CLINIC | Age: 45
End: 2025-07-24
Payer: COMMERCIAL

## 2025-07-24 DIAGNOSIS — G43.909 EPISODIC MIGRAINE: Primary | ICD-10-CM

## 2025-07-24 PROCEDURE — 96372 THER/PROPH/DIAG INJ SC/IM: CPT | Performed by: OTHER

## 2025-07-24 RX ORDER — DEXAMETHASONE SODIUM PHOSPHATE 10 MG/ML
10 INJECTION, SOLUTION INTRA-ARTICULAR; INTRALESIONAL; INTRAMUSCULAR; INTRAVENOUS; SOFT TISSUE ONCE
Status: COMPLETED | OUTPATIENT
Start: 2025-07-24 | End: 2025-07-24

## 2025-07-24 RX ORDER — KETOROLAC TROMETHAMINE 30 MG/ML
30 INJECTION, SOLUTION INTRAMUSCULAR; INTRAVENOUS ONCE
Status: COMPLETED | OUTPATIENT
Start: 2025-07-24 | End: 2025-07-24

## 2025-07-24 RX ADMIN — DEXAMETHASONE SODIUM PHOSPHATE 10 MG: 10 INJECTION, SOLUTION INTRA-ARTICULAR; INTRALESIONAL; INTRAMUSCULAR; INTRAVENOUS; SOFT TISSUE at 12:03:00

## 2025-07-24 RX ADMIN — KETOROLAC TROMETHAMINE 30 MG: 30 INJECTION, SOLUTION INTRAMUSCULAR; INTRAVENOUS at 12:04:00

## 2025-07-24 NOTE — PROGRESS NOTES
Patient is here today for Toradol and Decadron injection. Administered and patient left with no issues and feeling better.

## 2025-07-24 NOTE — TELEPHONE ENCOUNTER
Patient is calling stating she has been experiencing a migraine since yesterday 07/23 3pm. Patient stated she took Advil with no relieve. Patient stated she had been vomiting since this morning.Patient would like to speak with a nurse regarding the migraine.

## 2025-07-24 NOTE — TELEPHONE ENCOUNTER
Patient notified ok for Toradol/decadron injection.    Future Appointments   Date Time Provider Department Center   7/24/2025 11:45 AM [facility] NURSE [facility]   11/14/2025  2:50 PM Marcus Morrison MD [facility]

## 2025-07-24 NOTE — TELEPHONE ENCOUNTER
Acute Migraine assessment:    Is this your typical migraine?  Describe any change in character from past migraines.  No: lasting longer than normal. Usually can take Ibuprofen and it resolves.    Location of Pain (select all that apply):  left side and behind eyes  # Days pain has been present:  2    Describe the pain:  Pressure and Tight Band  Intensity of the headaches:    With medication: INCAPACITATING   Without medication INCAPACITATING  Associated Symptoms (check all that apply):  Nausea/Upset Stomach, Vomiting, and Sensitivity to light   Non-pharmaceutical interventions tried and outcome:   [x] Lying down / sleeping:  NO CHANGE  [x] Being in a dark quiet room:  NO CHANGE  [] Massage your head:   [] Cold pack on your head/neck:    [] Hot pack on your head/neck:    [] Other:     Abortive Medications tried:  Ibuprofen  Current preventative medication list:  no  Any missed doses?    Any other relevant information:  Started period yesterday  Any medications contraindicated?  Tried and failed?  Ibuprofen    Willing to try Medrol Dosepak?  Yes  Last taken:  6/27/25  Willing to try Toradol/Decadron injections?  Yes  Last taken:    Advised patient to seek immediate medical treatment in ED for any concerning symptoms or changes to condition.

## 2025-08-21 ENCOUNTER — TELEMEDICINE (OUTPATIENT)
Dept: INTERNAL MEDICINE CLINIC | Facility: CLINIC | Age: 45
End: 2025-08-21

## 2025-08-21 DIAGNOSIS — M24.80 GENERALIZED ARTICULAR HYPERMOBILITY: ICD-10-CM

## 2025-08-21 DIAGNOSIS — N95.1 PERIMENOPAUSAL: Primary | ICD-10-CM

## 2025-08-21 DIAGNOSIS — E66.09 CLASS 1 OBESITY DUE TO EXCESS CALORIES WITHOUT SERIOUS COMORBIDITY WITH BODY MASS INDEX (BMI) OF 34.0 TO 34.9 IN ADULT: ICD-10-CM

## 2025-08-21 DIAGNOSIS — F41.9 ANXIETY: ICD-10-CM

## 2025-08-21 DIAGNOSIS — E66.811 CLASS 1 OBESITY DUE TO EXCESS CALORIES WITHOUT SERIOUS COMORBIDITY WITH BODY MASS INDEX (BMI) OF 34.0 TO 34.9 IN ADULT: ICD-10-CM

## 2025-08-22 ENCOUNTER — PATIENT MESSAGE (OUTPATIENT)
Dept: INTERNAL MEDICINE CLINIC | Facility: CLINIC | Age: 45
End: 2025-08-22

## 2025-08-22 DIAGNOSIS — Z12.31 ENCOUNTER FOR SCREENING MAMMOGRAM FOR MALIGNANT NEOPLASM OF BREAST: Primary | ICD-10-CM

## (undated) NOTE — ED AVS SNAPSHOT
Edita Honeycutt   MRN: [id number]    Department:  [facility] Emergency Department in [place]   Date of Visit:  10/18/2017           Disclosure     Insurance plans vary and the physician(s) referred by the ER may not be covered by your plan.  Please If you have been prescribed any medication(s), please fill your prescription right away and begin taking the medication(s) as directed    If the emergency physician has read X-rays, these will be re-interpreted by a radiologist.  If there is a significant

## (undated) NOTE — ED AVS SNAPSHOT
Lucero Guevara   MRN: [id number]    Department:  [facility] Emergency Department in [place]   Date of Visit:  9/2/2018           Disclosure     Insurance plans vary and the physician(s) referred by the ER may not be covered by your plan.  Please co tell this physician (or your personal doctor if your instructions are to return to your personal doctor) about any new or lasting problems. The primary care or specialist physician will see patients referred from the [facility] Emergency Department.  Luc Porter

## (undated) NOTE — Clinical Note
Alexander Geller! I saw Ms. Fadi Menchaca for rheumatologic evaluation. Please see the discussion portion of my note and let me know if you have any questions.      Belkis Brooks, DO  [facility]  2/3/2022

## (undated) NOTE — LETTER
Patient Name: Johana Phillips  YOB: 1980          MRN number:  [id number]  Date:  5/17/2017  Referring Physician:  Ezra Escalante    Progress/Discharge Summary  Dx: KELLY hip pain, LBP, LLE paraesthesias           Authorized # of Visits: Flexion: 82 deg, no pain  Extension: 30 deg no pain  Sidebending: R WNL; L WNL   Rotation: R WNL; L WNL      Goals: (To be completed in 10 visits)  1.  Patient will demonstrate ability to squat to floor to retrieve greater than 20# without pain to assist he